# Patient Record
Sex: MALE | Race: WHITE | Employment: FULL TIME | URBAN - METROPOLITAN AREA
[De-identification: names, ages, dates, MRNs, and addresses within clinical notes are randomized per-mention and may not be internally consistent; named-entity substitution may affect disease eponyms.]

---

## 2017-01-03 ENCOUNTER — TELEPHONE (OUTPATIENT)
Dept: SLEEP MEDICINE | Age: 52
End: 2017-01-03

## 2017-01-20 ENCOUNTER — TELEPHONE (OUTPATIENT)
Dept: PULMONOLOGY | Facility: CLINIC | Age: 52
End: 2017-01-20

## 2017-01-20 NOTE — TELEPHONE ENCOUNTER
Patient returned call regarding scheduling a f/u. States he did not have sleep study due to cost and is no longer having symptoms. Will follow up with Dr. Scott Wallace from Cardiology next week and ask if a Pulmonary f/u is needed at that time.

## 2017-01-24 ENCOUNTER — OFFICE VISIT (OUTPATIENT)
Dept: CARDIOLOGY CLINIC | Age: 52
End: 2017-01-24

## 2017-01-24 VITALS
OXYGEN SATURATION: 98 % | DIASTOLIC BLOOD PRESSURE: 95 MMHG | SYSTOLIC BLOOD PRESSURE: 159 MMHG | HEIGHT: 75 IN | HEART RATE: 63 BPM | BODY MASS INDEX: 33.2 KG/M2 | WEIGHT: 267 LBS

## 2017-01-24 DIAGNOSIS — I48.20 CHRONIC ATRIAL FIBRILLATION (HCC): Primary | ICD-10-CM

## 2017-01-24 RX ORDER — AMIODARONE HYDROCHLORIDE 200 MG/1
200 TABLET ORAL DAILY
COMMUNITY
End: 2018-10-30 | Stop reason: SDUPTHER

## 2017-01-24 NOTE — PROGRESS NOTES
1. Have you been to the ER, urgent care clinic since your last visit? Hospitalized since your last visit? No    2. Have you seen or consulted any other health care providers outside of the [address] [place] [address] since your last visit? Include any pap smears or colon screening.  No

## 2017-01-24 NOTE — PROGRESS NOTES
[facility]    Mr. Nickie Alonso is a 45 y.o.male with history of paroxysmal atrial fibrillation s/p cardioversion 12/16, hypertension and obesity. Mr. Nickie Alonso is here today for follow up appointment. He is feeling okay. He denies any chest pain or shortness of breath. In fact he says after starting Amiodarone he has not felt any palpitations anymore. He is taking his medications regularly. He denies any side effects from the medication. He denies any bleeding problem. Denies any nausea, vomiting, abdominal pain, fever, chills, sputum production. No hematuria or other bleeding complaints    Past Medical History   Diagnosis Date    Atrial fibrillation ([facility] [address] 04/2016     S/P AMPARO CARDIOVERFSION (12/16)    HTN (hypertension)     Obesity          Past Surgical History   Procedure Laterality Date    Hx appendectomy         Current Outpatient Prescriptions   Medication Sig    amiodarone (CORDARONE) 200 mg tablet Take 200 mg by mouth daily.  simvastatin (ZOCOR) 10 mg tablet Take 10 mg by mouth nightly.  metoprolol succinate (TOPROL-XL) 50 mg XL tablet Take 50 mg by mouth daily.  rivaroxaban (XARELTO) 20 mg tab tablet Take 1 Tab by mouth daily (with dinner). No current facility-administered medications for this visit. Allergies and Sensitivities:  Allergies   Allergen Reactions    Aleve [Naproxen Sodium] Vertigo    Flagyl [Metronidazole] Vertigo       Family History:  Family History   Problem Relation Age of Onset   Willam Allen Cancer Mother     Heart Disease Mother     Dementia Father        Social History:  Social History   Substance Use Topics    Smoking status: Never Smoker    Smokeless tobacco: Never Used    Alcohol use No     He  reports that he has never smoked. He has never used smokeless tobacco.  He  reports that he does not drink alcohol. Review of Systems:  Cardiac symptoms as noted above in HPI.  All others negative. Denies joint pain, blurring vision, photophobia, neck pain, hemoptysis, chronic cough, nausea, vomiting, hematuria, burning micturition, BRBPR, chronic headaches. Physical Exam:  BP Readings from Last 3 Encounters:   01/24/17 (!) 159/95   12/20/16 130/80   12/20/16 (!) 137/98         Pulse Readings from Last 3 Encounters:   01/24/17 63   12/20/16 73   12/20/16 98          Wt Readings from Last 3 Encounters:   01/24/17 267 lb (121.1 kg)   12/20/16 259 lb (117.5 kg)   12/20/16 259 lb (117.5 kg)     Constitutional: Oriented to person, place, and time. HENT: Head: Normocephalic and atraumatic. Neck: No JVD present. Cardiovascular: Regular rhythm. No murmur, gallop or rubs appreciated  Lung: Breath sounds normal. No respiratory distress. No ronchi or rales appreciated  Abdominal: No tenderness. No rebound and no guarding. Musculoskeletal: There is no lower extremity edema. No cynosis    Review of Data  LABS:   Lab Results   Component Value Date/Time    Sodium 138 12/01/2016 09:38 AM    Potassium 4.6 12/01/2016 09:38 AM    Chloride 99 12/01/2016 09:38 AM    CO2 27 12/01/2016 09:38 AM    Glucose 119 12/01/2016 09:38 AM    BUN 14 12/01/2016 09:38 AM    Creatinine 0.99 12/01/2016 09:38 AM     No flowsheet data found. Lab Results   Component Value Date/Time    ALT 39 04/20/2016 01:15 PM     No results found for: HBA1C, HGBE8, GQN6CUHK, TPI2KOSW, IUR7BELU    EKG  (05/16) Sinus rhythm at 68 bpm. Normal NJ and QRS interval. No ST changes of ischemia. (12/16) A.fib at 88 bpm.   (01/17) Sinus bradycardia at 57 bp    ECHO (04/16)  Left ventricle: Systolic function was at the lower limits of normal.  Ejection fraction was estimated to be 50 %. No obvious wall motion  abnormalities identified in the views obtained. Wall thickness was mildly  increased. There was mild concentric hypertrophy. The study was not  technically sufficient to allow evaluation of LV diastolic function.   Right ventricle: Systolic function was normal.  Mitral valve: There was trivial regurgitation. Aortic valve: There was no stenosis. IMPRESSION & PLAN:  Mr. Staci Levin is a 45 y.o.d male with paroxysmal atrial fibrillation, hypertension, obesity. Atrial fibrillation:    Paroxysmal a.fib   AMPARO cardioversion (12/16) was successful and back in NSR  But on 12/20/16, back in a.fib. Started on amiodarone on 12/21/16 and now back in NSR again. Continue amiodarone to maintain rhythm. Long term side effects was discussed. Recent AST and TSH and PFT within normal limits. Continue xarelto. If remains in sinus rhythm, may consider multaq and may consider DC xarelto in future. For now continue all same    Hypertension:  He is on Toprol XL 50 mg daily. Repeat /84 mm hg.     Obesity:  He is performing exercise program to lose weight. Today his weight is 267 pounds. His weight is up. He states he has not been exercising and he is planning to work on that     Probable sleep apnea:  Sleep study ordered by pulmonary team. But not done yet. He will schedule. Importance of diet and exercise was discussed with patient. This plan was discussed with patient who is in agreement. Thank you for allowing me to participate in patient care. Please feel free to call me if you have any question or concern. Jc Whitehead MD  Please note: This document has been produced using voice recognition software. Unrecognized errors in transcription may be present.

## 2017-01-24 NOTE — LETTER
2/27/2017 Patient:  Yuri Keys YOB: 1965 Date of Visit: 1/24/2017 [place] 
[address] [facility] [address] [address] VIA Facsimile: [phone] 
 : 
 
 
                                                           [facility] Mr. Darien Ferreira is a 45 y.o.male with history of paroxysmal atrial fibrillation s/p cardioversion 12/16, hypertension and obesity. Mr. Darien Ferreira is here today for follow up appointment. He is feeling okay. He denies any chest pain or shortness of breath. In fact he says after starting Amiodarone he has not felt any palpitations anymore. He is taking his medications regularly. He denies any side effects from the medication. He denies any bleeding problem. Denies any nausea, vomiting, abdominal pain, fever, chills, sputum production. No hematuria or other bleeding complaints Past Medical History Diagnosis Date  Atrial fibrillation ([address] 04/2016 S/P AMPARO CARDIOVERFSION (12/16)  HTN (hypertension)  Obesity Past Surgical History Procedure Laterality Date  Hx appendectomy Current Outpatient Prescriptions Medication Sig  
 amiodarone (CORDARONE) 200 mg tablet Take 200 mg by mouth daily.  simvastatin (ZOCOR) 10 mg tablet Take 10 mg by mouth nightly.  metoprolol succinate (TOPROL-XL) 50 mg XL tablet Take 50 mg by mouth daily.  rivaroxaban (XARELTO) 20 mg tab tablet Take 1 Tab by mouth daily (with dinner). No current facility-administered medications for this visit. Allergies and Sensitivities: 
Allergies Allergen Reactions  Aleve [Naproxen Sodium] Vertigo  Flagyl [Metronidazole] Vertigo Family History: 
Family History Problem Relation Age of Onset  Cancer Mother  Heart Disease Mother  Dementia Father Social History: 
Social History Substance Use Topics  Smoking status: Never Smoker  Smokeless tobacco: Never Used  Alcohol use No  
 
He  reports that he has never smoked. He has never used smokeless tobacco.  He  reports that he does not drink alcohol. Review of Systems: 
Cardiac symptoms as noted above in HPI. All others negative. Denies joint pain, blurring vision, photophobia, neck pain, hemoptysis, chronic cough, nausea, vomiting, hematuria, burning micturition, BRBPR, chronic headaches. Physical Exam: 
BP Readings from Last 3 Encounters:  
01/24/17 (!) 159/95  
12/20/16 130/80  
12/20/16 (!) 137/98 Pulse Readings from Last 3 Encounters:  
01/24/17 63  
12/20/16 73  
12/20/16 98 Wt Readings from Last 3 Encounters:  
01/24/17 267 lb (121.1 kg) 12/20/16 259 lb (117.5 kg) 12/20/16 259 lb (117.5 kg) Constitutional: Oriented to person, place, and time. HENT: Head: Normocephalic and atraumatic. Neck: No JVD present. Cardiovascular: Regular rhythm. No murmur, gallop or rubs appreciated Lung: Breath sounds normal. No respiratory distress. No ronchi or rales appreciated Abdominal: No tenderness. No rebound and no guarding. Musculoskeletal: There is no lower extremity edema. No cynosis Review of Data LABS:  
Lab Results Component Value Date/Time Sodium 138 12/01/2016 09:38 AM  
 Potassium 4.6 12/01/2016 09:38 AM  
 Chloride 99 12/01/2016 09:38 AM  
 CO2 27 12/01/2016 09:38 AM  
 Glucose 119 12/01/2016 09:38 AM  
 BUN 14 12/01/2016 09:38 AM  
 Creatinine 0.99 12/01/2016 09:38 AM  
 
No flowsheet data found. Lab Results Component Value Date/Time ALT 39 04/20/2016 01:15 PM  
 
No results found for: HBA1C, HGBE8, MUS2DYML, GYC6ATXG, PYJ3RDBU 
 
EKG 
(05/16) Sinus rhythm at 68 bpm. Normal NJ and QRS interval. No ST changes of ischemia. (12/16) A.fib at 88 bpm.  
(01/17) Sinus bradycardia at 57 bp 
 
ECHO (04/16) Left ventricle: Systolic function was at the lower limits of normal. 
Ejection fraction was estimated to be 50 %. No obvious wall motion abnormalities identified in the views obtained. Wall thickness was mildly 
increased. There was mild concentric hypertrophy. The study was not 
technically sufficient to allow evaluation of LV diastolic function. Right ventricle: Systolic function was normal. 
Mitral valve: There was trivial regurgitation. Aortic valve: There was no stenosis. IMPRESSION & PLAN: 
Mr. Shannon Wilson is a 45 y.o.d male with paroxysmal atrial fibrillation, hypertension, obesity. Atrial fibrillation:   
Paroxysmal a.fib  
AMPARO cardioversion (12/16) was successful and back in NSR But on 12/20/16, back in a.fib. Started on amiodarone on 12/21/16 and now back in NSR again. Continue amiodarone to maintain rhythm. Long term side effects was discussed. Recent AST and TSH and PFT within normal limits. Continue xarelto. If remains in sinus rhythm, may consider multaq and may consider DC xarelto in future. For now continue all same Hypertension:  He is on Toprol XL 50 mg daily. Repeat /84 mm hg.  
 
Obesity:  He is performing exercise program to lose weight. Today his weight is 267 pounds. His weight is up. He states he has not been exercising and he is planning to work on that Probable sleep apnea:  Sleep study ordered by pulmonary team. But not done yet. He will schedule. Importance of diet and exercise was discussed with patient. This plan was discussed with patient who is in agreement. Thank you for allowing me to participate in patient care. Please feel free to call me if you have any question or concern. Noa Givens MD 
Please note: This document has been produced using voice recognition software. Unrecognized errors in transcription may be present. If you have questions, please do not hesitate to call me. I look forward to following Mr. Shannon Wilson along with you. Sincerely, Allen Fenton MD

## 2017-01-24 NOTE — MR AVS SNAPSHOT
Visit Information Date & Time Provider Department Dept. Phone Encounter #  
 1/24/2017  8:45 AM Armani Sloan MD [address] [place] [address] Specialist at [place] [id number] Follow-up Instructions Return in about 6 months (around 7/24/2017). Your Appointments 7/25/2017  8:45 AM  
Follow Up with Armani Sloan MD  
Cardio Specialist at [facility] [address] [facility]) Appt Note: 6 m f/u  
 [address]  
  
   
 [address] [facility] [address] Upcoming Health Maintenance Date Due Hepatitis C Screening 1965 DTaP/Tdap/Td series (1 - Tdap) 8/31/1985 FOBT Q 1 YEAR AGE 50-75 8/31/2014 INFLUENZA AGE 9 TO ADULT 8/1/2016 Allergies as of 1/24/2017  Review Complete On: 1/24/2017 By: Kamron Day LPN Severity Noted Reaction Type Reactions Aleve [Naproxen Sodium]  12/23/2014    Vertigo Flagyl [Metronidazole]  12/23/2014    Vertigo Current Immunizations  Reviewed on 12/20/2016 No immunizations on file. Not reviewed this visit You Were Diagnosed With   
  
 Codes Comments Chronic atrial fibrillation (HCC)    -  Primary ICD-10-CM: Y04.1 ICD-9-CM: 427.31 Vitals BP Pulse Height(growth percentile) Weight(growth percentile) SpO2 BMI  
 (!) 159/95 63 6' 3\" (1.905 m) 267 lb (121.1 kg) 98% 33.37 kg/m2 Smoking Status Never Smoker Vitals History BMI and BSA Data Body Mass Index Body Surface Area  
 33.37 kg/m 2 2.53 m 2 Preferred Pharmacy Pharmacy Name Phone [address] [place] [phone] Your Updated Medication List  
  
   
This list is accurate as of: 1/24/17  9:05 AM.  Always use your most recent med list.  
  
  
  
  
 amiodarone 200 mg tablet Commonly known as:  CORDARONE Take 200 mg by mouth daily. metoprolol succinate 50 mg XL tablet Commonly known as:  TOPROL-XL Take 50 mg by mouth daily. rivaroxaban 20 mg Tab tablet Commonly known as:  Rock Snowball Take 1 Tab by mouth daily (with dinner). simvastatin 10 mg tablet Commonly known as:  ZOCOR Take 10 mg by mouth nightly. We Performed the Following AMB POC EKG ROUTINE W/ 12 LEADS, INTER & REP [31544 CPT(R)] Follow-up Instructions Return in about 6 months (around 7/24/2017). Introducing [facility]! [employer] introduces [employer] patient portal. Now you can access parts of your medical record, email your doctor's office, and request medication refills online. 1. In your internet browser, go to [email or web address] 2. Click on the First Time User? Click Here link in the Sign In box. You will see the New Member Sign Up page. 3. Enter your [employer] Access Code exactly as it appears below. You will not need to use this code after youve completed the sign-up process. If you do not sign up before the expiration date, you must request a new code. · [employer] Access Code: [email or web address] Expires: 2/27/2017  9:55 AM 
 
4. Enter the last four digits of your Social Security Number (xxxx) and Date of Birth (mm/dd/yyyy) as indicated and click Submit. You will be taken to the next sign-up page. 5. Create a [employer] ID. This will be your [employer] login ID and cannot be changed, so think of one that is secure and easy to remember. 6. Create a [employer] password. You can change your password at any time. 7. Enter your Password Reset Question and Answer. This can be used at a later time if you forget your password. 8. Enter your e-mail address. You will receive e-mail notification when new information is available in [address] 9. Click Sign Up. You can now view and download portions of your medical record.  
10. Click the Download Summary menu link to download a portable copy of your medical information. If you have questions, please visit the Frequently Asked Questions section of the [employer] website. Remember, [employer] is NOT to be used for urgent needs. For medical emergencies, dial 911. Now available from your iPhone and Android! Please provide this summary of care documentation to your next provider. Your primary care clinician is listed as Emma Ortega. If you have any questions after today's visit, please call [phone].

## 2017-02-27 NOTE — COMMUNICATION BODY
Cardiovascular Specialists    Mr. Debra Barnes is a 45 y.o.male with history of paroxysmal atrial fibrillation s/p cardioversion 12/16, hypertension and obesity. Mr. Debra Barnes is here today for follow up appointment. He is feeling okay. He denies any chest pain or shortness of breath. In fact he says after starting Amiodarone he has not felt any palpitations anymore. He is taking his medications regularly. He denies any side effects from the medication. He denies any bleeding problem. Denies any nausea, vomiting, abdominal pain, fever, chills, sputum production. No hematuria or other bleeding complaints    Past Medical History   Diagnosis Date    Atrial fibrillation ([address] 04/2016     S/P AMPARO CARDIOVERFSION (12/16)    HTN (hypertension)     Obesity          Past Surgical History   Procedure Laterality Date    Hx appendectomy         Current Outpatient Prescriptions   Medication Sig    amiodarone (CORDARONE) 200 mg tablet Take 200 mg by mouth daily.  simvastatin (ZOCOR) 10 mg tablet Take 10 mg by mouth nightly.  metoprolol succinate (TOPROL-XL) 50 mg XL tablet Take 50 mg by mouth daily.  rivaroxaban (XARELTO) 20 mg tab tablet Take 1 Tab by mouth daily (with dinner). No current facility-administered medications for this visit. Allergies and Sensitivities:  Allergies   Allergen Reactions    Aleve [Naproxen Sodium] Vertigo    Flagyl [Metronidazole] Vertigo       Family History:  Family History   Problem Relation Age of Onset   Velta Ganser Cancer Mother     Heart Disease Mother     Dementia Father        Social History:  Social History   Substance Use Topics    Smoking status: Never Smoker    Smokeless tobacco: Never Used    Alcohol use No     He  reports that he has never smoked. He has never used smokeless tobacco.  He  reports that he does not drink alcohol. Review of Systems:  Cardiac symptoms as noted above in HPI.  All others negative. Denies joint pain, blurring vision, photophobia, neck pain, hemoptysis, chronic cough, nausea, vomiting, hematuria, burning micturition, BRBPR, chronic headaches. Physical Exam:  BP Readings from Last 3 Encounters:   01/24/17 (!) 159/95   12/20/16 130/80   12/20/16 (!) 137/98         Pulse Readings from Last 3 Encounters:   01/24/17 63   12/20/16 73   12/20/16 98          Wt Readings from Last 3 Encounters:   01/24/17 267 lb (121.1 kg)   12/20/16 259 lb (117.5 kg)   12/20/16 259 lb (117.5 kg)     Constitutional: Oriented to person, place, and time. HENT: Head: Normocephalic and atraumatic. Neck: No JVD present. Cardiovascular: Regular rhythm. No murmur, gallop or rubs appreciated  Lung: Breath sounds normal. No respiratory distress. No ronchi or rales appreciated  Abdominal: No tenderness. No rebound and no guarding. Musculoskeletal: There is no lower extremity edema. No cynosis    Review of Data  LABS:   Lab Results   Component Value Date/Time    Sodium 138 12/01/2016 09:38 AM    Potassium 4.6 12/01/2016 09:38 AM    Chloride 99 12/01/2016 09:38 AM    CO2 27 12/01/2016 09:38 AM    Glucose 119 12/01/2016 09:38 AM    BUN 14 12/01/2016 09:38 AM    Creatinine 0.99 12/01/2016 09:38 AM     No flowsheet data found. Lab Results   Component Value Date/Time    ALT 39 04/20/2016 01:15 PM     No results found for: HBA1C, HGBE8, LOZ0UGNR, QMB4XYKP, VGM5CIOF    EKG  (05/16) Sinus rhythm at 68 bpm. Normal RI and QRS interval. No ST changes of ischemia. (12/16) A.fib at 88 bpm.   (01/17) Sinus bradycardia at 57 bp    ECHO (04/16)  Left ventricle: Systolic function was at the lower limits of normal.  Ejection fraction was estimated to be 50 %. No obvious wall motion  abnormalities identified in the views obtained. Wall thickness was mildly  increased. There was mild concentric hypertrophy. The study was not  technically sufficient to allow evaluation of LV diastolic function.   Right ventricle: Systolic function was normal.  Mitral valve: There was trivial regurgitation. Aortic valve: There was no stenosis. IMPRESSION & PLAN:  Mr. Shante Flor is a 45 y.o.d male with paroxysmal atrial fibrillation, hypertension, obesity. Atrial fibrillation:    Paroxysmal a.fib   AMPARO cardioversion (12/16) was successful and back in NSR  But on 12/20/16, back in a.fib. Started on amiodarone on 12/21/16 and now back in NSR again. Continue amiodarone to maintain rhythm. Long term side effects was discussed. Recent AST and TSH and PFT within normal limits. Continue xarelto. If remains in sinus rhythm, may consider multaq and may consider DC xarelto in future. For now continue all same    Hypertension:  He is on Toprol XL 50 mg daily. Repeat /84 mm hg.     Obesity:  He is performing exercise program to lose weight. Today his weight is 267 pounds. His weight is up. He states he has not been exercising and he is planning to work on that     Probable sleep apnea:  Sleep study ordered by pulmonary team. But not done yet. He will schedule. Importance of diet and exercise was discussed with patient. This plan was discussed with patient who is in agreement. Thank you for allowing me to participate in patient care. Please feel free to call me if you have any question or concern. Laila Moraes MD  Please note: This document has been produced using voice recognition software. Unrecognized errors in transcription may be present.

## 2017-06-29 RX ORDER — RIVAROXABAN 20 MG/1
TABLET, FILM COATED ORAL
Qty: 30 TAB | Refills: 6 | Status: SHIPPED | OUTPATIENT
Start: 2017-06-29 | End: 2018-03-19 | Stop reason: SDUPTHER

## 2017-08-15 ENCOUNTER — OFFICE VISIT (OUTPATIENT)
Dept: CARDIOLOGY CLINIC | Age: 52
End: 2017-08-15

## 2017-08-15 VITALS
HEIGHT: 75 IN | WEIGHT: 275 LBS | OXYGEN SATURATION: 97 % | DIASTOLIC BLOOD PRESSURE: 96 MMHG | BODY MASS INDEX: 34.19 KG/M2 | HEART RATE: 62 BPM | SYSTOLIC BLOOD PRESSURE: 157 MMHG

## 2017-08-15 DIAGNOSIS — I48.0 PAF (PAROXYSMAL ATRIAL FIBRILLATION) (HCC): Primary | ICD-10-CM

## 2017-08-15 DIAGNOSIS — I10 ESSENTIAL HYPERTENSION WITH GOAL BLOOD PRESSURE LESS THAN 140/90: ICD-10-CM

## 2017-08-15 DIAGNOSIS — E66.01 MORBID OBESITY, UNSPECIFIED OBESITY TYPE (HCC): ICD-10-CM

## 2017-08-15 NOTE — PROGRESS NOTES
1. Have you been to the ER, urgent care clinic since your last visit? Hospitalized since your last visit? No    2. Have you seen or consulted any other health care providers outside of the [address] [place] [address] since your last visit? Include any pap smears or colon screening.  No

## 2017-08-15 NOTE — PROGRESS NOTES
[facility]ists    Mr. Piage Walker is a 45 y.o.male with history of paroxysmal atrial fibrillation s/p cardioversion 12/16, hypertension and obesity. Mr. Paige Walker is here today for follow up appointment. He denies any symptoms of palpitations or atrial fibrillation. He denies any presyncope or syncope. He is taking his medications regularly. Overall he has no symptoms and he is tolerating his medications well. Denies any nausea, vomiting, abdominal pain, fever, chills, sputum production. No hematuria or other bleeding complaints    Past Medical History:   Diagnosis Date    Atrial fibrillation ([address] 04/2016    S/P AMPARO CARDIOVERFSION (12/16)    HTN (hypertension)     Obesity          Past Surgical History:   Procedure Laterality Date    HX APPENDECTOMY         Current Outpatient Prescriptions   Medication Sig    XARELTO 20 mg tab tablet take 1 tablet by mouth ONCE DAILY WITH DINNER    amiodarone (CORDARONE) 200 mg tablet Take 200 mg by mouth daily.  metoprolol succinate (TOPROL-XL) 50 mg XL tablet Take 50 mg by mouth daily. No current facility-administered medications for this visit. Allergies and Sensitivities:  Allergies   Allergen Reactions    Aleve [Naproxen Sodium] Vertigo    Flagyl [Metronidazole] Vertigo       Family History:  Family History   Problem Relation Age of Onset   Patel Bogus Cancer Mother     Heart Disease Mother     Dementia Father        Social History:  Social History   Substance Use Topics    Smoking status: Never Smoker    Smokeless tobacco: Never Used    Alcohol use No     He  reports that he has never smoked. He has never used smokeless tobacco.  He  reports that he does not drink alcohol. Review of Systems:  Cardiac symptoms as noted above in HPI. All others negative.   Denies joint pain, blurring vision, photophobia, neck pain, hemoptysis, chronic cough, nausea, vomiting, hematuria, burning micturition, BRBPR, chronic headaches. Physical Exam:  BP Readings from Last 3 Encounters:   08/15/17 (!) 157/96   01/24/17 (!) 159/95   12/20/16 130/80         Pulse Readings from Last 3 Encounters:   08/15/17 62   01/24/17 63   12/20/16 73          Wt Readings from Last 3 Encounters:   08/15/17 275 lb (124.7 kg)   01/24/17 267 lb (121.1 kg)   12/20/16 259 lb (117.5 kg)     Constitutional: Oriented to person, place, and time. HENT: Head: Normocephalic and atraumatic. Neck: No JVD present. Cardiovascular: Regular rhythm. No murmur, gallop or rubs appreciated  Lung: Breath sounds normal. No respiratory distress. No ronchi or rales appreciated  Abdominal: No tenderness. No rebound and no guarding. Musculoskeletal: There is no lower extremity edema. No cynosis    Review of Data  LABS:   Lab Results   Component Value Date/Time    Sodium 138 12/01/2016 09:38 AM    Potassium 4.6 12/01/2016 09:38 AM    Chloride 99 12/01/2016 09:38 AM    CO2 27 12/01/2016 09:38 AM    Glucose 119 12/01/2016 09:38 AM    BUN 14 12/01/2016 09:38 AM    Creatinine 0.99 12/01/2016 09:38 AM     No flowsheet data found. Lab Results   Component Value Date/Time    ALT (SGPT) 39 04/20/2016 01:15 PM     No results found for: HBA1C, HGBE8, ZPI6BGSR, TNR0FVTC, JVR5MCVO    (06/17)   , TG 66, , HDL 43. TSH 1.9  AST 16, ALT 21      EKG  (05/16) Sinus rhythm at 68 bpm. Normal MO and QRS interval. No ST changes of ischemia. (12/16) A.fib at 88 bpm.   (01/17) Sinus bradycardia at 57 bp    ECHO (04/16)  Left ventricle: Systolic function was at the lower limits of normal.  Ejection fraction was estimated to be 50 %. No obvious wall motion  abnormalities identified in the views obtained. Wall thickness was mildly  increased. There was mild concentric hypertrophy. The study was not  technically sufficient to allow evaluation of LV diastolic function. Right ventricle: Systolic function was normal.  Mitral valve:  There was trivial regurgitation. Aortic valve: There was no stenosis. IMPRESSION & PLAN:  Mr. Farrukh Randhawa is a 45 y.o.d male with paroxysmal atrial fibrillation, hypertension, obesity. Atrial fibrillation:    Paroxysmal a.fib   AMPARO cardioversion (12/16) was successful and back in NSR  But on 12/20/16, back in a.fib. Started on amiodarone on 12/21/16 and now back in NSR again on exam since then  Continue amiodarone to maintain rhythm. Long term side effects was discussed. Recent AST and TSH and PFT within normal limits in 06/17  Continue xarelto. If remains in sinus rhythm, may consider multaq if any concern with side effects from amiodarone and may consider DC xarelto in future. Hypertension:  He is on Toprol XL 50 mg daily. BP repeat 150/90 mm Hg. Obesity: Today his weight is 267--->275 pounds. His weight is up. DW patient and advice to lose weight      Importance of diet and exercise was discussed with patient. This plan was discussed with patient who is in agreement. Thank you for allowing me to participate in patient care. Please feel free to call me if you have any question or concern. Kiara Rashid MD  Please note: This document has been produced using voice recognition software. Unrecognized errors in transcription may be present.

## 2017-08-15 NOTE — MR AVS SNAPSHOT
Visit Information Date & Time Provider Department Dept. Phone Encounter #  
 8/15/2017  3:15 PM Armani Freeman  [facility] [phone] [id number] Follow-up Instructions Return in about 1 year (around 8/15/2018). Upcoming Health Maintenance Date Due Hepatitis C Screening 1965 DTaP/Tdap/Td series (1 - Tdap) 8/31/1985 FOBT Q 1 YEAR AGE 50-75 8/31/2014 INFLUENZA AGE 9 TO ADULT 8/1/2017 Allergies as of 8/15/2017  Review Complete On: 8/15/2017 By: Keith Monte LPN Severity Noted Reaction Type Reactions Aleve [Naproxen Sodium]  12/23/2014    Vertigo Flagyl [Metronidazole]  12/23/2014    Vertigo Current Immunizations  Reviewed on 12/20/2016 No immunizations on file. Not reviewed this visit Vitals BP Pulse Height(growth percentile) Weight(growth percentile) SpO2 BMI  
 (!) 157/96 62 6' 3\" (1.905 m) 275 lb (124.7 kg) 97% 34.37 kg/m2 Smoking Status Never Smoker BMI and BSA Data Body Mass Index Body Surface Area  
 34.37 kg/m 2 2.57 m 2 Preferred Pharmacy Pharmacy Name Phone [address] [facility] [phone] Your Updated Medication List  
  
   
This list is accurate as of: 8/15/17  3:15 PM.  Always use your most recent med list.  
  
  
  
  
 amiodarone 200 mg tablet Commonly known as:  CORDARONE Take 200 mg by mouth daily. metoprolol succinate 50 mg XL tablet Commonly known as:  TOPROL-XL Take 50 mg by mouth daily. XARELTO 20 mg Tab tablet Generic drug:  rivaroxaban  
take 1 tablet by mouth ONCE DAILY WITH DINNER Follow-up Instructions Return in about 1 year (around 8/15/2018). Patient Instructions Decrease Amiodarone to every other day Introducing [facility]!    
 Delfino Martins introduces [employer] patient portal. Now you can access parts of your medical record, email your doctor's office, and request medication refills online. 1. In your internet browser, go to [email or web address] 2. Click on the First Time User? Click Here link in the Sign In box. You will see the New Member Sign Up page. 3. Enter your Youxinp[employer] Access Code exactly as it appears below. You will not need to use this code after youve completed the sign-up process. If you do not sign up before the expiration date, you must request a new code. · Youxin[employer] Access Code: [email or web address] Expires: 11/13/2017  3:15 PM 
 
4. Enter the last four digits of your Social Security Number (xxxx) and Date of Birth (mm/dd/yyyy) as indicated and click Submit. You will be taken to the next sign-up page. 5. Create a Youxinpai ID. This will be your Youxinpai login ID and cannot be changed, so think of one that is secure and easy to remember. 6. Create a Youxinpai password. You can change your password at any time. 7. Enter your Password Reset Question and Answer. This can be used at a later time if you forget your password. 8. Enter your e-mail address. You will receive e-mail notification when new information is available in [address] 9. Click Sign Up. You can now view and download portions of your medical record. 10. Click the Download Summary menu link to download a portable copy of your medical information. If you have questions, please visit the Frequently Asked Questions section of the Youxinpai website. Remember, Youxinpai is NOT to be used for urgent needs. For medical emergencies, dial 911. Now available from your iPhone and Android! Please provide this summary of care documentation to your next provider. Your primary care clinician is listed as Ryne Santa. If you have any questions after today's visit, please call [phone].

## 2017-11-06 RX ORDER — AMIODARONE HYDROCHLORIDE 200 MG/1
TABLET ORAL
Qty: 50 TAB | Refills: 6 | Status: SHIPPED | OUTPATIENT
Start: 2017-11-06 | End: 2018-10-30 | Stop reason: SDUPTHER

## 2018-03-19 RX ORDER — RIVAROXABAN 20 MG/1
TABLET, FILM COATED ORAL
Qty: 30 TAB | Refills: 6 | Status: SHIPPED | OUTPATIENT
Start: 2018-03-19 | End: 2018-11-10 | Stop reason: SDUPTHER

## 2018-10-30 RX ORDER — AMIODARONE HYDROCHLORIDE 200 MG/1
200 TABLET ORAL DAILY
Qty: 90 TAB | Refills: 3 | Status: SHIPPED | OUTPATIENT
Start: 2018-10-30 | End: 2018-11-26

## 2018-10-30 NOTE — TELEPHONE ENCOUNTER
Patient is calling to request refill on Amiodarone 200 mg, he would like it sent to [facility]. Patient has follow up appointment scheduled for 11/26/2018.

## 2018-11-14 RX ORDER — RIVAROXABAN 20 MG/1
TABLET, FILM COATED ORAL
Qty: 30 TAB | Refills: 6 | Status: SHIPPED | OUTPATIENT
Start: 2018-11-14 | End: 2019-09-02 | Stop reason: SDUPTHER

## 2018-11-26 ENCOUNTER — OFFICE VISIT (OUTPATIENT)
Dept: CARDIOLOGY CLINIC | Age: 53
End: 2018-11-26

## 2018-11-26 VITALS
OXYGEN SATURATION: 98 % | SYSTOLIC BLOOD PRESSURE: 155 MMHG | HEART RATE: 61 BPM | WEIGHT: 265 LBS | DIASTOLIC BLOOD PRESSURE: 97 MMHG | BODY MASS INDEX: 33.12 KG/M2

## 2018-11-26 DIAGNOSIS — I10 ESSENTIAL HYPERTENSION WITH GOAL BLOOD PRESSURE LESS THAN 140/90: ICD-10-CM

## 2018-11-26 DIAGNOSIS — E66.01 MORBID OBESITY, UNSPECIFIED OBESITY TYPE (HCC): ICD-10-CM

## 2018-11-26 DIAGNOSIS — I48.0 PAF (PAROXYSMAL ATRIAL FIBRILLATION) (HCC): Primary | ICD-10-CM

## 2018-11-26 RX ORDER — AMIODARONE HYDROCHLORIDE 200 MG/1
200 TABLET ORAL EVERY OTHER DAY
Qty: 90 TAB | Refills: 3 | Status: SHIPPED | OUTPATIENT
Start: 2018-11-26 | End: 2020-01-01

## 2018-11-26 NOTE — PROGRESS NOTES
1. Have you been to the ER, urgent care clinic since your last visit? Hospitalized since your last visit? No  
 
2. Have you seen or consulted any other health care providers outside of the [address] [place] [address] since your last visit? Include any pap smears or colon screening.  No

## 2018-11-26 NOTE — PROGRESS NOTES
Cardiovascular Specialists Mr. Alyssia Ramos is a 46 y.o.male with history of paroxysmal atrial fibrillation s/p cardioversion 12/16, hypertension and obesity. Mr. Alyssia Ramos is here today for follow up appointment. Denies any resting or exertional chest pain or chest pressure to suggest angina or any dyspnea to suggest heart failure. No presyncope or syncope Denies any PND or LE edema. Taking all medications regularly. Rides bike and roller blade for 7-10 miles without any symptoms. Was able to lose weight with diet and exercise down to 241 lbs but now gained back Denies any nausea, vomiting, abdominal pain, fever, chills, sputum production. No hematuria or other bleeding complaints Past Medical History:  
Diagnosis Date  Atrial fibrillation ([address] 04/2016 S/P AMPARO CARDIOVERFSION (12/16)  HTN (hypertension)  Obesity Past Surgical History:  
Procedure Laterality Date  HX APPENDECTOMY Current Outpatient Medications Medication Sig  XARELTO 20 mg tab tablet take 1 tablet by mouth once daily WITH DINNER  
 amiodarone (CORDARONE) 200 mg tablet Take 1 Tab by mouth daily.  metoprolol succinate (TOPROL-XL) 50 mg XL tablet Take 50 mg by mouth daily. No current facility-administered medications for this visit. Allergies and Sensitivities: 
Allergies Allergen Reactions  Aleve [Naproxen Sodium] Vertigo  Flagyl [Metronidazole] Vertigo Family History: 
Family History Problem Relation Age of Onset  Cancer Mother  Heart Disease Mother  Dementia Father Social History: 
Social History Tobacco Use  Smoking status: Never Smoker  Smokeless tobacco: Never Used Substance Use Topics  Alcohol use: No  
 Drug use: No  
 
He  reports that  has never smoked. he has never used smokeless tobacco.  He  reports that he does not drink alcohol. Review of Systems: Cardiac symptoms as noted above in HPI. All others negative. Denies joint pain, blurring vision, photophobia, neck pain, hemoptysis, chronic cough, nausea, vomiting, hematuria, burning micturition, BRBPR, chronic headaches. Physical Exam: 
BP Readings from Last 3 Encounters:  
11/26/18 (!) 155/97  
08/15/17 (!) 157/96  
01/24/17 (!) 159/95 Pulse Readings from Last 3 Encounters:  
11/26/18 61  
08/15/17 62  
01/24/17 63 Wt Readings from Last 3 Encounters:  
11/26/18 265 lb (120.2 kg) 08/15/17 275 lb (124.7 kg) 01/24/17 267 lb (121.1 kg) Constitutional: Oriented to person, place, and time. HENT: Head: Normocephalic and atraumatic. Neck: No JVD present. Cardiovascular: Regular rhythm. No murmur, gallop or rubs appreciated Lung: Breath sounds normal. No respiratory distress. No ronchi or rales appreciated Abdominal: No tenderness. No rebound and no guarding. Musculoskeletal: There is no lower extremity edema. No cynosis Review of Data LABS:  
Lab Results Component Value Date/Time Sodium 138 12/01/2016 09:38 AM  
 Potassium 4.6 12/01/2016 09:38 AM  
 Chloride 99 12/01/2016 09:38 AM  
 CO2 27 12/01/2016 09:38 AM  
 Glucose 119 (H) 12/01/2016 09:38 AM  
 BUN 14 12/01/2016 09:38 AM  
 Creatinine 0.99 12/01/2016 09:38 AM  
 
No flowsheet data found. Lab Results Component Value Date/Time ALT (SGPT) 39 04/20/2016 01:15 PM  
 
No results found for: HBA1C, HGBE8, WLU6ITCO, IIG3XOMW, CKG2TKSO 
 
(06/17) , TG 66, , HDL 43. TSH 1.9 AST 16, ALT 21 EKG 
(05/16) Sinus rhythm at 68 bpm. Normal MD and QRS interval. No ST changes of ischemia. (12/16) A.fib at 88 bpm.  
(01/17) Sinus bradycardia at 57 bp 
 
ECHO (04/16) Left ventricle: Systolic function was at the lower limits of normal. 
Ejection fraction was estimated to be 50 %. No obvious wall motion 
abnormalities identified in the views obtained. Wall thickness was mildly increased. There was mild concentric hypertrophy. The study was not 
technically sufficient to allow evaluation of LV diastolic function. Right ventricle: Systolic function was normal. 
Mitral valve: There was trivial regurgitation. Aortic valve: There was no stenosis. IMPRESSION & PLAN: 
Mr. Becca Aquino is a 46 y.o.d male with paroxysmal atrial fibrillation, hypertension, obesity. Atrial fibrillation:   
Paroxysmal a.fib  
AMPARO cardioversion (12/16) was successful and back in NSR But on 12/20/16, back in a.fib. Started on amiodarone on 12/21/16 and now back in NSR again on exam since then Continue amiodarone to maintain rhythm. On every other day. Continue xarelto. If remains in sinus rhythm, may consider multaq if any concern with side effects from amiodarone and may consider DC xarelto in future. Hypertension:  He is on Toprol XL 50 mg daily. BP repeat 155/96 mm Hg. Prepeat BP was 148/92 mm Hg. Important of weight loss and diet was discussed. Obesity: Today his weight is 275-->241--> 265 pounds. His weight is up. DW patient and advice to lose weight as he was able to do recently and now has gained back up. Importance of diet and exercise was discussed with patient. This plan was discussed with patient who is in agreement. Thank you for allowing me to participate in patient care. Please feel free to call me if you have any question or concern. Chari Pearce MD 
Please note: This document has been produced using voice recognition software. Unrecognized errors in transcription may be present.

## 2018-11-28 ENCOUNTER — TELEPHONE (OUTPATIENT)
Dept: CARDIOLOGY CLINIC | Age: 53
End: 2018-11-28

## 2018-11-28 NOTE — TELEPHONE ENCOUNTER
Contacted pt a [facility] pharmacist. Two patient Identifiers confirmed. Advised per notes pt is to have 90/90 but may take medication prn as needed daily. Pharmacist verbalized understanding.

## 2019-09-03 RX ORDER — RIVAROXABAN 20 MG/1
TABLET, FILM COATED ORAL
Qty: 30 TAB | Refills: 6 | Status: SHIPPED | OUTPATIENT
Start: 2019-09-03 | End: 2020-03-16

## 2020-01-01 RX ORDER — AMIODARONE HYDROCHLORIDE 200 MG/1
TABLET ORAL
Qty: 90 TAB | Refills: 4 | Status: SHIPPED | OUTPATIENT
Start: 2020-01-01 | End: 2021-03-29 | Stop reason: SDUPTHER

## 2020-03-16 RX ORDER — RIVAROXABAN 20 MG/1
TABLET, FILM COATED ORAL
Qty: 30 TAB | Refills: 6 | Status: SHIPPED | OUTPATIENT
Start: 2020-03-16 | End: 2020-04-08

## 2020-04-08 RX ORDER — RIVAROXABAN 20 MG/1
TABLET, FILM COATED ORAL
Qty: 30 TAB | Refills: 6 | Status: SHIPPED | OUTPATIENT
Start: 2020-04-08 | End: 2021-04-20

## 2020-09-09 RX ORDER — AMIODARONE HYDROCHLORIDE 200 MG/1
TABLET ORAL
Qty: 90 TAB | Refills: 4 | Status: CANCELLED | OUTPATIENT
Start: 2020-09-09

## 2020-09-09 RX ORDER — METOPROLOL SUCCINATE 50 MG/1
50 TABLET, EXTENDED RELEASE ORAL DAILY
Status: CANCELLED | OUTPATIENT
Start: 2020-09-09

## 2020-09-09 NOTE — TELEPHONE ENCOUNTER
Requested Prescriptions     Pending Prescriptions Disp Refills    amiodarone (CORDARONE) 200 mg tablet 90 Tab 4    metoprolol succinate (TOPROL-XL) 50 mg XL tablet        Sig: Take 1 Tab by mouth daily.

## 2020-09-10 NOTE — TELEPHONE ENCOUNTER
PCP: Komal Elizabeth MD    Last appt: 11/26/2018  No future appointments. Requested Prescriptions     Pending Prescriptions Disp Refills    amiodarone (CORDARONE) 200 mg tablet 90 Tab 0     Sig: TAKE 1 TABLET DAILY. Appt required before further refills.  metoprolol succinate (TOPROL-XL) 50 mg XL tablet 90 Tab 0     Sig: Take 1 Tab by mouth daily. Appt required before further refills. Other Comments:  Last seen 2018. Needs appt before refills can be authorized.

## 2020-11-06 RX ORDER — METOPROLOL SUCCINATE 50 MG/1
50 TABLET, EXTENDED RELEASE ORAL DAILY
Status: CANCELLED | OUTPATIENT
Start: 2020-11-06

## 2020-11-06 RX ORDER — AMIODARONE HYDROCHLORIDE 200 MG/1
TABLET ORAL
Qty: 90 TAB | Refills: 4 | Status: CANCELLED | OUTPATIENT
Start: 2020-11-06

## 2020-11-06 NOTE — TELEPHONE ENCOUNTER
Requested Prescriptions     Pending Prescriptions Disp Refills    metoprolol succinate (TOPROL-XL) 50 mg XL tablet        Sig: Take 1 Tab by mouth daily.  amiodarone (CORDARONE) 200 mg tablet 90 Tab 4       Patient out of medication.

## 2020-11-09 NOTE — TELEPHONE ENCOUNTER
Pt last seen 2018. Appt required. Attempted to contact pt at  number, no answer. Lvm on secure line over due for appt. Medication refill deferred top PCP until appt completed. Advised pt to return call to office at [phone] to schedule f/u appt.

## 2020-11-23 ENCOUNTER — OFFICE VISIT (OUTPATIENT)
Dept: CARDIOLOGY CLINIC | Age: 55
End: 2020-11-23
Payer: COMMERCIAL

## 2020-11-23 ENCOUNTER — HOSPITAL ENCOUNTER (OUTPATIENT)
Dept: LAB | Age: 55
Discharge: HOME | End: 2020-11-23

## 2020-11-23 VITALS
RESPIRATION RATE: 16 BRPM | TEMPERATURE: 97.8 F | WEIGHT: 270.6 LBS | DIASTOLIC BLOOD PRESSURE: 88 MMHG | SYSTOLIC BLOOD PRESSURE: 142 MMHG | HEIGHT: 75 IN | OXYGEN SATURATION: 98 % | HEART RATE: 63 BPM | BODY MASS INDEX: 33.64 KG/M2

## 2020-11-23 DIAGNOSIS — I10 ESSENTIAL HYPERTENSION WITH GOAL BLOOD PRESSURE LESS THAN 140/90: Primary | ICD-10-CM

## 2020-11-23 DIAGNOSIS — I48.0 PAF (PAROXYSMAL ATRIAL FIBRILLATION) (HCC): ICD-10-CM

## 2020-11-23 LAB — XX-LABCORP SPECIMEN COL,LCBCF: NORMAL

## 2020-11-23 PROCEDURE — 93000 ELECTROCARDIOGRAM COMPLETE: CPT | Performed by: INTERNAL MEDICINE

## 2020-11-23 PROCEDURE — 99001 SPECIMEN HANDLING PT-LAB: CPT

## 2020-11-23 PROCEDURE — 99214 OFFICE O/P EST MOD 30 MIN: CPT | Performed by: INTERNAL MEDICINE

## 2020-11-23 RX ORDER — METOPROLOL SUCCINATE 50 MG/1
50 TABLET, EXTENDED RELEASE ORAL DAILY
Qty: 30 TAB | Refills: 1 | Status: SHIPPED | OUTPATIENT
Start: 2020-11-23 | End: 2020-12-09 | Stop reason: SDUPTHER

## 2020-11-23 NOTE — PATIENT INSTRUCTIONS
New Medication/Medication Changes Start taking Amiodarone every three days **please allow 24-48 hrs for medication to be escribed to pharmacy** Labs CMP and TSH No appointment required for lab work Hours are Mon-Fri 7:00 am-5:00 pm 
All labs are completed at: 
[address] [place] [address]

## 2020-11-23 NOTE — LETTER
11/23/20 Patient: Darleen Sterling YOB: 1965 Date of Visit: 11/23/2020 Rachelle Chan MD 
[address] [facility] [address] [id number] VIA Facsimile: [phone] Dear Rachelle Chan MD, Thank you for referring Mr. Theodora Willard to CARDIO SPECIALIST AT [address] [place] [address] for evaluation. My notes for this consultation are attached. If you have questions, please do not hesitate to call me. I look forward to following your patient along with you. Sincerely, Chevy Caruso MD

## 2020-11-23 NOTE — PROGRESS NOTES
Laurel Larson presents today for No chief complaint on file. Laurel Larson preferred language for health care discussion is english/other. Personal Protective Equipment:   Personal Protective Equipment was used including: mask-surgical and hands-gloves. Patient was placed on no precaution(s). Patient was masked. Is someone accompanying this pt? No    Is the patient using any DME equipment during OV? No    Depression Screening:  3 most recent PHQ Screens 11/26/2018   Little interest or pleasure in doing things Not at all   Feeling down, depressed, irritable, or hopeless Not at all   Total Score PHQ 2 0       Learning Assessment:  Learning Assessment 5/12/2016   PRIMARY LEARNER Patient   PRIMARY LANGUAGE ENGLISH   LEARNER PREFERENCE PRIMARY DEMONSTRATION   ANSWERED BY Patient   RELATIONSHIP SELF       Abuse Screening:  No flowsheet data found. Fall Risk  No flowsheet data found. Pt currently taking Anticoagulant therapy? No    Coordination of Care:  1. Have you been to the ER, urgent care clinic since your last visit? Hospitalized since your last visit? No    2. Have you seen or consulted any other health care providers outside of the [address] [place] [address] since your last visit? Include any pap smears or colon screening.  No

## 2020-11-23 NOTE — PROGRESS NOTES
Cardiovascular Specialists    Mr. Matias King is a 63 y.o.male with history of paroxysmal atrial fibrillation s/p cardioversion 12/16, hypertension and obesity. Mr. Matias King is here today for follow up appointment. At one episode of likely atrial fibrillation about 9 months ago which resolved itself. Otherwise patient has no symptoms. He denies any symptoms concerning for angina or heart failure. Denies PND or any lower extremity swelling. Taking all his medication without any side effects. Denies any nausea, vomiting, abdominal pain, fever, chills, sputum production. No hematuria or other bleeding complaints    Past Medical History:   Diagnosis Date    Atrial fibrillation ([address] 04/2016    S/P AMPARO CARDIOVERFSION (12/16)    HTN (hypertension)     Obesity          Past Surgical History:   Procedure Laterality Date    HX APPENDECTOMY         Current Outpatient Medications   Medication Sig    Xarelto 20 mg tab tablet take 1 tablet by mouth once daily with dinner    amiodarone (CORDARONE) 200 mg tablet TAKE 1 TABLET DAILY    metoprolol succinate (TOPROL-XL) 50 mg XL tablet Take 50 mg by mouth daily. No current facility-administered medications for this visit. Allergies and Sensitivities:  Allergies   Allergen Reactions    Aleve [Naproxen Sodium] Vertigo    Flagyl [Metronidazole] Vertigo       Family History:  Family History   Problem Relation Age of Onset   [de-identified] Cancer Mother     Heart Disease Mother     Dementia Father        Social History:  Social History     Tobacco Use    Smoking status: Never Smoker    Smokeless tobacco: Never Used   Substance Use Topics    Alcohol use: No    Drug use: No     He  reports that he has never smoked. He has never used smokeless tobacco.  He  reports no history of alcohol use. Review of Systems:  Cardiac symptoms as noted above in HPI. All others negative.   Physical Exam:  BP Readings from Last 3 Encounters:   11/23/20 (!) 142/88   11/26/18 (!) 155/97   08/15/17 (!) 157/96         Pulse Readings from Last 3 Encounters:   11/23/20 63   11/26/18 61   08/15/17 62          Wt Readings from Last 3 Encounters:   11/23/20 270 lb 9.6 oz (122.7 kg)   11/26/18 265 lb (120.2 kg)   08/15/17 275 lb (124.7 kg)     Constitutional: Oriented to person, place, and time. HENT: Head: Normocephalic and atraumatic. Neck: No JVD present. Cardiovascular: Regular rhythm. No murmur, gallop or rubs appreciated  Lung: Breath sounds normal. No respiratory distress. No ronchi or rales appreciated  Abdominal: No tenderness. No rebound and no guarding. Musculoskeletal: There is no lower extremity edema. No cynosis    Review of Data  LABS:   Lab Results   Component Value Date/Time    Sodium 138 12/01/2016 09:38 AM    Potassium 4.6 12/01/2016 09:38 AM    Chloride 99 12/01/2016 09:38 AM    CO2 27 12/01/2016 09:38 AM    Glucose 119 (H) 12/01/2016 09:38 AM    BUN 14 12/01/2016 09:38 AM    Creatinine 0.99 12/01/2016 09:38 AM     No flowsheet data found. Lab Results   Component Value Date/Time    ALT (SGPT) 39 04/20/2016 01:15 PM     No results found for: HBA1C, HGBE8, YYR8AISB, PRZ7EOGD, JRG2LAML    (06/17)   , TG 66, , HDL 43. TSH 1.9  AST 16, ALT 21      EKG  (05/16) Sinus rhythm at 68 bpm. Normal NJ and QRS interval. No ST changes of ischemia. (12/16) A.fib at 88 bpm.   (01/17) Sinus bradycardia at 57 bp    ECHO (04/16)  Left ventricle: Systolic function was at the lower limits of normal.  Ejection fraction was estimated to be 50 %. No obvious wall motion  abnormalities identified in the views obtained. Wall thickness was mildly  increased. There was mild concentric hypertrophy. The study was not  technically sufficient to allow evaluation of LV diastolic function. Right ventricle: Systolic function was normal.  Mitral valve: There was trivial regurgitation. Aortic valve: There was no stenosis.     IMPRESSION & PLAN:  Mr. Pratik Paz is a 63 y.o.d male with paroxysmal atrial fibrillation, hypertension, obesity. Atrial fibrillation:    Paroxysmal a.fib   AMPARO cardioversion (12/16) was successful and back in NSR  But on 12/20/16, back in a.fib. Started on amiodarone on 12/21/16 and now back in NSR again on exam since then  Continue amiodarone to maintain rhythm. Currently taking amiodarone every other day. Reasonable to consider slowly taper down into every third day  He has appointment with his eye doctor in 1 week. Will check CMP and TSH  Continue xarelto. If remains in sinus rhythm, may consider multaq if any concern with side effects from amiodarone and may consider DC xarelto in future. Hypertension: Pressure stable. Continue current medication    Obesity: Today his weight is 275-->241--> 270 pounds. His weight is up. He is going to start exercise program again. This plan was discussed with patient who is in agreement. Thank you for allowing me to participate in patient care. Please feel free to call me if you have any question or concern. Frances Grossman MD  Please note: This document has been produced using voice recognition software. Unrecognized errors in transcription may be present.

## 2020-11-24 LAB
ALBUMIN SERPL-MCNC: 4.4 G/DL (ref 3.8–4.9)
ALBUMIN/GLOB SERPL: 1.8 {RATIO} (ref 1.2–2.2)
ALP SERPL-CCNC: 60 IU/L (ref 39–117)
ALT SERPL-CCNC: 25 IU/L (ref 0–44)
AST SERPL-CCNC: 20 IU/L (ref 0–40)
BILIRUB SERPL-MCNC: 0.7 MG/DL (ref 0–1.2)
BUN SERPL-MCNC: 11 MG/DL (ref 6–24)
BUN/CREAT SERPL: 11 (ref 9–20)
CALCIUM SERPL-MCNC: 9.7 MG/DL (ref 8.7–10.2)
CHLORIDE SERPL-SCNC: 100 MMOL/L (ref 96–106)
CO2 SERPL-SCNC: 27 MMOL/L (ref 20–29)
CREAT SERPL-MCNC: 1.02 MG/DL (ref 0.76–1.27)
GLOBULIN SER CALC-MCNC: 2.5 G/DL (ref 1.5–4.5)
GLUCOSE SERPL-MCNC: 81 MG/DL (ref 65–99)
POTASSIUM SERPL-SCNC: 4.6 MMOL/L (ref 3.5–5.2)
PROT SERPL-MCNC: 6.9 G/DL (ref 6–8.5)
SODIUM SERPL-SCNC: 138 MMOL/L (ref 134–144)
T4 FREE SERPL-MCNC: 1.09 NG/DL (ref 0.82–1.77)
TSH SERPL DL<=0.005 MIU/L-ACNC: 3.26 UIU/ML (ref 0.45–4.5)

## 2020-12-11 RX ORDER — METOPROLOL SUCCINATE 50 MG/1
50 TABLET, EXTENDED RELEASE ORAL DAILY
Qty: 30 TAB | Refills: 1 | Status: SHIPPED | OUTPATIENT
Start: 2020-12-11 | End: 2021-03-04 | Stop reason: SDUPTHER

## 2021-03-04 NOTE — TELEPHONE ENCOUNTER
Requested Prescriptions     Pending Prescriptions Disp Refills    metoprolol succinate (TOPROL-XL) 50 mg XL tablet       Sig: Take 1 Tab by mouth daily.      90 day mailorder pharmacy order

## 2021-03-04 NOTE — TELEPHONE ENCOUNTER
Requested Prescriptions     Pending Prescriptions Disp Refills    metoprolol succinate (TOPROL-XL) 50 mg XL tablet 30 Tab 1     Sig: Take 1 Tab by mouth daily.        30 day local pharmacy fill

## 2021-03-05 RX ORDER — METOPROLOL SUCCINATE 50 MG/1
50 TABLET, EXTENDED RELEASE ORAL DAILY
Qty: 90 TAB | Refills: 3 | Status: SHIPPED | OUTPATIENT
Start: 2021-03-05 | End: 2021-06-01 | Stop reason: ALTCHOICE

## 2021-03-05 RX ORDER — METOPROLOL SUCCINATE 50 MG/1
50 TABLET, EXTENDED RELEASE ORAL DAILY
Qty: 30 TAB | Refills: 1 | Status: SHIPPED | OUTPATIENT
Start: 2021-03-05 | End: 2022-04-13 | Stop reason: SDUPTHER

## 2021-03-29 RX ORDER — AMIODARONE HYDROCHLORIDE 200 MG/1
200 TABLET ORAL
Qty: 60 TAB | Refills: 1 | Status: SHIPPED | OUTPATIENT
Start: 2021-03-29 | End: 2021-06-03 | Stop reason: SDUPTHER

## 2021-03-29 RX ORDER — AMIODARONE HYDROCHLORIDE 200 MG/1
200 TABLET ORAL
Qty: 5 TAB | Refills: 0 | Status: SHIPPED | OUTPATIENT
Start: 2021-03-29 | End: 2021-04-02

## 2021-03-29 NOTE — TELEPHONE ENCOUNTER
PCP: Tracee Florez MD    Last appt: 11/23/2020  No future appointments. Requested Prescriptions     Pending Prescriptions Disp Refills    amiodarone (CORDARONE) 200 mg tablet 5 Tab 0     Sig: Take 1 Tab by mouth every three (3) days. .       Request for a 30 or 90 day supply? Provider Discretion    Pharmacy: Confirmed    Other Comments:  Medication refill request via phone. Patient request emergency supply be sent to pharmacy since mail order would take longer. Patient out of medication.

## 2021-03-29 NOTE — TELEPHONE ENCOUNTER
PCP: Emmanuel Clancy MD    Last appt: 11/23/2020  No future appointments. Requested Prescriptions     Pending Prescriptions Disp Refills    amiodarone (CORDARONE) 200 mg tablet 60 Tab 1     Sig: Take 1 Tab by mouth every three (3) days. .       Request for a 30 or 90 day supply? Provider Discretion    Pharmacy: Confirmed    Other Comments:  Medication refill request via phone.

## 2021-04-02 RX ORDER — AMIODARONE HYDROCHLORIDE 200 MG/1
TABLET ORAL
Qty: 5 TAB | Refills: 0 | Status: SHIPPED | OUTPATIENT
Start: 2021-04-02 | End: 2021-06-01

## 2021-04-20 RX ORDER — RIVAROXABAN 20 MG/1
TABLET, FILM COATED ORAL
Qty: 30 TAB | Refills: 6 | Status: SHIPPED | OUTPATIENT
Start: 2021-04-20 | End: 2021-12-06

## 2021-05-24 ENCOUNTER — TELEPHONE (OUTPATIENT)
Dept: CARDIOLOGY CLINIC | Age: 56
End: 2021-05-24

## 2021-05-24 NOTE — TELEPHONE ENCOUNTER
[facility]. Two patient Identifiers confirmed. Advised pt appt required. Pt verbalized understanding and scheduled for 06/01/21.

## 2021-06-01 ENCOUNTER — OFFICE VISIT (OUTPATIENT)
Dept: CARDIOLOGY CLINIC | Age: 56
End: 2021-06-01
Payer: COMMERCIAL

## 2021-06-01 VITALS
WEIGHT: 261.8 LBS | HEART RATE: 99 BPM | TEMPERATURE: 98.1 F | OXYGEN SATURATION: 99 % | HEIGHT: 75 IN | RESPIRATION RATE: 16 BRPM | DIASTOLIC BLOOD PRESSURE: 91 MMHG | SYSTOLIC BLOOD PRESSURE: 139 MMHG | BODY MASS INDEX: 32.55 KG/M2

## 2021-06-01 DIAGNOSIS — I48.0 PAF (PAROXYSMAL ATRIAL FIBRILLATION) (HCC): Primary | ICD-10-CM

## 2021-06-01 DIAGNOSIS — R06.09 DOE (DYSPNEA ON EXERTION): ICD-10-CM

## 2021-06-01 PROCEDURE — 93000 ELECTROCARDIOGRAM COMPLETE: CPT | Performed by: INTERNAL MEDICINE

## 2021-06-01 PROCEDURE — 99214 OFFICE O/P EST MOD 30 MIN: CPT | Performed by: INTERNAL MEDICINE

## 2021-06-01 NOTE — LETTER
6/1/2021 Patient: Robert Das YOB: 1965 Date of Visit: 6/1/2021 Cheyenne Barrios MD 
[address] [place] [address] Via Fax: [phone] Dear Cheyenne Barrios MD, Thank you for referring Mr. Ilir Macdonald to [facility]IST AT [facility] for evaluation. My notes for this consultation are attached. If you have questions, please do not hesitate to call me. I look forward to following your patient along with you. Sincerely, Jose Dinh MD

## 2021-06-01 NOTE — PROGRESS NOTES
[facility]    Mr. Temo Mace is a 63 y.o.male with history of paroxysmal atrial fibrillation s/p cardioversion 12/16, hypertension and obesity. Mr. Temo Mace is here today for follow up appointment. For last 2 weeks, patient has been feeling that he has been back in atrial fibrillation. He does not feel palpitation however he can tell that his pulse has been erratic and he gets short of breath. He denies any lower smell swelling. He denies any anginal symptoms. He tried to take higher dose of amiodarone without any success. He denies any presyncope or syncope  Denies any nausea, vomiting, abdominal pain, fever, chills, sputum production. No hematuria or other bleeding complaints    Past Medical History:   Diagnosis Date    Atrial fibrillation ([address] 04/2016    S/P AMPARO CARDIOVERFSION (12/16)    HTN (hypertension)     Obesity          Past Surgical History:   Procedure Laterality Date    HX APPENDECTOMY         Current Outpatient Medications   Medication Sig    Xarelto 20 mg tab tablet take 1 tablet by mouth once daily with dinner    metoprolol succinate (TOPROL-XL) 50 mg XL tablet Take 1 Tab by mouth daily.  amiodarone (CORDARONE) 200 mg tablet Take 1 Tab by mouth every three (3) days. .     No current facility-administered medications for this visit. Allergies and Sensitivities:  Allergies   Allergen Reactions    Aleve [Naproxen Sodium] Vertigo    Flagyl [Metronidazole] Vertigo       Family History:  Family History   Problem Relation Age of Onset   Zina Gaines Cancer Mother     Heart Disease Mother     Dementia Father        Social History:  Social History     Tobacco Use    Smoking status: Never Smoker    Smokeless tobacco: Never Used   Substance Use Topics    Alcohol use: No    Drug use: No     He  reports that he has never smoked. He has never used smokeless tobacco.  He  reports no history of alcohol use.     Review of Systems:  Cardiac symptoms as noted above in HPI. All others negative. Physical Exam:  BP Readings from Last 3 Encounters:   06/01/21 (!) 139/91   11/23/20 (!) 142/88   11/26/18 (!) 155/97         Pulse Readings from Last 3 Encounters:   06/01/21 99   11/23/20 63   11/26/18 61          Wt Readings from Last 3 Encounters:   06/01/21 118.8 kg (261 lb 12.8 oz)   11/23/20 122.7 kg (270 lb 9.6 oz)   11/26/18 120.2 kg (265 lb)     Constitutional: Oriented to person, place, and time. HENT: Head: Normocephalic and atraumatic. Neck: No JVD present. Cardiovascular: Regular rhythm. No murmur, gallop or rubs appreciated  Lung: Breath sounds normal. No respiratory distress. No ronchi or rales appreciated  Abdominal: No tenderness. No rebound and no guarding. Musculoskeletal: There is no lower extremity edema. No cynosis    Review of Data  LABS:   Lab Results   Component Value Date/Time    Sodium 138 11/23/2020 12:00 AM    Potassium 4.6 11/23/2020 12:00 AM    Chloride 100 11/23/2020 12:00 AM    CO2 27 11/23/2020 12:00 AM    Glucose 81 11/23/2020 12:00 AM    BUN 11 11/23/2020 12:00 AM    Creatinine 1.02 11/23/2020 12:00 AM     No flowsheet data found. Lab Results   Component Value Date/Time    ALT (SGPT) 25 11/23/2020 12:00 AM     No results found for: HBA1C, AFZ1NBQU, OEH0IUBP, XAP9JVFC    (06/17)   , TG 66, , HDL 43. TSH 1.9  AST 16, ALT 21      EKG  (05/16) Sinus rhythm at 68 bpm. Normal SC and QRS interval. No ST changes of ischemia. (12/16) A.fib at 88 bpm.   (01/17) Sinus bradycardia at 57 bp    ECHO (04/16)  Left ventricle: Systolic function was at the lower limits of normal.  Ejection fraction was estimated to be 50 %. No obvious wall motion  abnormalities identified in the views obtained. Wall thickness was mildly  increased. There was mild concentric hypertrophy. The study was not  technically sufficient to allow evaluation of LV diastolic function.     IMPRESSION & PLAN:   Lori Ambriznt is a 63 y.o.d male with paroxysmal atrial fibrillation, hypertension, obesity. Atrial fibrillation:    Paroxysmal a.fib   AMPARO cardioversion (12/16) was successful and back in NSR  But on 12/20/16, back in a.fib. Started on amiodarone on 12/21/16 and now back in NSR again on exam since then  For last 2 weeks he has been feeling some shortness of breath and feels like his pulse has been erratic  We will check EKG today. On exam feels like he may be in sinus rhythm. Continue xarelto. Will increase amiodarone to 200 mg twice a day for 1 week and then 200 mg once a day. Will order echo to make sure he does not have any A. fib related cardiomyopathy. Hypertension: Pressure stable. Continue current medication    Obesity: Today his weight is 275-->241--> 261 pounds. This plan was discussed with patient who is in agreement. Thank you for allowing me to participate in patient care. Please feel free to call me if you have any question or concern. Ana Matthews MD  Please note: This document has been produced using voice recognition software. Unrecognized errors in transcription may be present.

## 2021-06-01 NOTE — PROGRESS NOTES
Shannon Razo presents today for   Chief Complaint   Patient presents with    Follow-up     [place]       Shannon Razo preferred language for health care discussion is english/other. Personal Protective Equipment:   Personal Protective Equipment was used including: mask-surgical and hands-gloves. Patient was placed on no precaution(s). Patient was masked. Precautions:   Patient currently on None  Patient currently roomed with door closed    Is someone accompanying this pt? no    Is the patient using any DME equipment during [address]? no    Depression Screening:  3 most recent PHQ Screens 6/1/2021   Little interest or pleasure in doing things Not at all   Feeling down, depressed, irritable, or hopeless Not at all   Total Score PHQ 2 0       Learning Assessment:  Learning Assessment 5/12/2016   PRIMARY LEARNER Patient   PRIMARY LANGUAGE ENGLISH   LEARNER PREFERENCE PRIMARY DEMONSTRATION   ANSWERED BY Patient   RELATIONSHIP SELF       Abuse Screening:  No flowsheet data found. Fall Risk  No flowsheet data found. Pt currently taking Anticoagulant therapy? no    Coordination of Care:  1. Have you been to the ER, urgent care clinic since your last visit? Hospitalized since your last visit? no    2. Have you seen or consulted any other health care providers outside of the [address] [place] [address] since your last visit? Include any pap smears or colon screening.  no

## 2021-06-01 NOTE — PATIENT INSTRUCTIONS
Testing Echo 
 
**call office 3-5 days after testing is completed for results** New Medication/Medication Changes Amiodarone 200 mg twice a day  x 7 days then decrease to taking amiodarone 200 mg once daily ** If you need any refills on medications please contact your pharmacy so that the request can be escribed to the provider for review.

## 2021-06-03 RX ORDER — AMIODARONE HYDROCHLORIDE 200 MG/1
200 TABLET ORAL DAILY
Qty: 90 TABLET | Refills: 3 | Status: SHIPPED | OUTPATIENT
Start: 2021-06-03 | End: 2021-12-17 | Stop reason: SDUPTHER

## 2021-06-03 NOTE — TELEPHONE ENCOUNTER
Requested Prescriptions     Pending Prescriptions Disp Refills    amiodarone (CORDARONE) 200 mg tablet 60 Tablet 1     Sig: Take 1 Tablet by mouth every three (3) days.  .     Patient stated he was to get refill at last OV

## 2021-06-11 ENCOUNTER — TELEPHONE (OUTPATIENT)
Dept: CARDIOLOGY CLINIC | Age: 56
End: 2021-06-11

## 2021-06-11 NOTE — TELEPHONE ENCOUNTER
Pt called stating that hes back in a fib. Pt is currently taking amiodarone 200 mg 1 po every day. I spoke to dr Mary Alice Elliott regarding. Per dr Mary Alice Elliott: pt needs to take amiodarone 200 mg 1 po bid, bring pt in for visit and ekg NEXT week. appt time 2:30 pm 6/15/21  Attempted to contact pt at  number, no answer. Lvm for pt to return call to office at [phone]. Will continue to try to contact pt.

## 2021-06-14 NOTE — TELEPHONE ENCOUNTER
Attempted to contact pt at  number, no answer. Lvm for pt to return call to office at [phone]. Will continue to try to contact pt.

## 2021-06-15 ENCOUNTER — OFFICE VISIT (OUTPATIENT)
Dept: CARDIOLOGY CLINIC | Age: 56
End: 2021-06-15
Payer: COMMERCIAL

## 2021-06-15 VITALS
BODY MASS INDEX: 32.45 KG/M2 | HEART RATE: 61 BPM | OXYGEN SATURATION: 99 % | SYSTOLIC BLOOD PRESSURE: 146 MMHG | HEIGHT: 75 IN | WEIGHT: 261 LBS | DIASTOLIC BLOOD PRESSURE: 91 MMHG

## 2021-06-15 DIAGNOSIS — I48.20 CHRONIC ATRIAL FIBRILLATION (HCC): Primary | ICD-10-CM

## 2021-06-15 PROCEDURE — 99213 OFFICE O/P EST LOW 20 MIN: CPT | Performed by: INTERNAL MEDICINE

## 2021-06-15 NOTE — PATIENT INSTRUCTIONS
Testing   Echo    New Medication/Medication Changes  AMIODARONE 200 MG 1 TAB TWICE DAILY X 1 WEEK THEN ONCE DAILY    **please allow 24-48 hrs for medication to be escribed to pharmacy** If you need any refills on medications please contact your pharmacy so that the request can be escribed to the provider

## 2021-06-15 NOTE — PROGRESS NOTES
Cardiovascular Specialists    Mr. Jose Gallagher is a 63 y.o.male with history of paroxysmal atrial fibrillation s/p cardioversion 12/16, hypertension and obesity. Mr. Jose Gallagher is here today for add-on appointment. Patient had another episode of prolonged atrial fibrillation with rapid ventricle response causing some shortness of breath. Today he says that he feels like he is back in normal rhythm. He is feeling back to normal.  He has been taking amiodarone 200 mg once daily however because of this episode of atrial fibrillation he has increase that to twice a day for last couple days. He denies any presyncope or syncope  Denies any nausea, vomiting, abdominal pain, fever, chills, sputum production. No hematuria or other bleeding complaints    Past Medical History:   Diagnosis Date    Atrial fibrillation ([address] 04/2016    S/P AMPARO CARDIOVERFSION (12/16)    HTN (hypertension)     Obesity          Past Surgical History:   Procedure Laterality Date    HX APPENDECTOMY         Current Outpatient Medications   Medication Sig    amiodarone (CORDARONE) 200 mg tablet Take 1 Tablet by mouth daily. Amairani Glass Xarelto 20 mg tab tablet take 1 tablet by mouth once daily with dinner    metoprolol succinate (TOPROL-XL) 50 mg XL tablet Take 1 Tab by mouth daily. No current facility-administered medications for this visit. Allergies and Sensitivities:  Allergies   Allergen Reactions    Aleve [Naproxen Sodium] Vertigo    Flagyl [Metronidazole] Vertigo       Family History:  Family History   Problem Relation Age of Onset   Chaya Cancer Mother     Heart Disease Mother     Dementia Father        Social History:  Social History     Tobacco Use    Smoking status: Never Smoker    Smokeless tobacco: Never Used   Substance Use Topics    Alcohol use: No    Drug use: No     He  reports that he has never smoked.  He has never used smokeless tobacco.  He  reports no history of alcohol use. Review of Systems:  Cardiac symptoms as noted above in HPI. All others negative. Physical Exam:  BP Readings from Last 3 Encounters:   06/15/21 (!) 146/91   06/01/21 (!) 139/91   11/23/20 (!) 142/88         Pulse Readings from Last 3 Encounters:   06/15/21 61   06/01/21 99   11/23/20 63          Wt Readings from Last 3 Encounters:   06/15/21 118.4 kg (261 lb)   06/01/21 118.8 kg (261 lb 12.8 oz)   11/23/20 122.7 kg (270 lb 9.6 oz)     Constitutional: Oriented to person, place, and time. HENT: Head: Normocephalic and atraumatic. Neck: No JVD present. Cardiovascular: Regular rhythm. No murmur, gallop or rubs appreciated  Lung: Breath sounds normal. No respiratory distress. No ronchi or rales appreciated  Abdominal: No tenderness. No rebound and no guarding. Musculoskeletal: There is no lower extremity edema. No cynosis    Review of Data  LABS:   Lab Results   Component Value Date/Time    Sodium 138 11/23/2020 12:00 AM    Potassium 4.6 11/23/2020 12:00 AM    Chloride 100 11/23/2020 12:00 AM    CO2 27 11/23/2020 12:00 AM    Glucose 81 11/23/2020 12:00 AM    BUN 11 11/23/2020 12:00 AM    Creatinine 1.02 11/23/2020 12:00 AM     No flowsheet data found. Lab Results   Component Value Date/Time    ALT (SGPT) 25 11/23/2020 12:00 AM     No results found for: HBA1C, GOD4LPCY, IGN6VWGH, UCR1DTXG    (06/17)   , TG 66, , HDL 43. TSH 1.9  AST 16, ALT 21      EKG  (05/16) Sinus rhythm at 68 bpm. Normal NM and QRS interval. No ST changes of ischemia. (12/16) A.fib at 88 bpm.   (01/17) Sinus bradycardia at 57 bp  (06/21) atrial fibrillation with nonspecific ST-T changes    ECHO (04/16)  Left ventricle: Systolic function was at the lower limits of normal.  Ejection fraction was estimated to be 50 %. No obvious wall motion  abnormalities identified in the views obtained. Wall thickness was mildly  increased. There was mild concentric hypertrophy.  The study was not  technically sufficient to allow evaluation of LV diastolic function. IMPRESSION & PLAN:  Mr. Danish Horne is a 63 y.o.d male with paroxysmal atrial fibrillation, hypertension, obesity. Atrial fibrillation:    Paroxysmal a.fib   AMPARO cardioversion (12/16) was successful and back in NSR  But on 12/20/16, back in a.fib. Started on amiodarone on 12/21/16   For last 2 weeks he has been feeling some shortness of breath and feels like his pulse has been erratic  On exam he is back in sinus rhythm. Dose of amiodarone has been increased. I have asked him to take amiodarone to her milligrams twice daily for next 1 week and then reduce back to once a day. Continue xarelto. Echo has been ordered and it is supposed to be done in 2 weeks. Different antiarrhythmic medication and ablation was discussed. For now would like to continue with amiodarone. Last TSH, AST and ALT were checked in 11/2020 and they were normal    Hypertension: Pressure stable. Continue current medication    Obesity: Today his weight is 275-->241--> 261 pounds. This plan was discussed with patient who is in agreement. Thank you for allowing me to participate in patient care. Please feel free to call me if you have any question or concern. Panda Wise MD  Please note: This document has been produced using voice recognition software. Unrecognized errors in transcription may be present.

## 2021-12-06 RX ORDER — RIVAROXABAN 20 MG/1
TABLET, FILM COATED ORAL
Qty: 30 TABLET | Refills: 6 | Status: SHIPPED | OUTPATIENT
Start: 2021-12-06 | End: 2022-07-17

## 2021-12-17 NOTE — TELEPHONE ENCOUNTER
PCP: Regla Arthur MD    Last appt: 12/16/2021  No future appointments. Requested Prescriptions     Pending Prescriptions Disp Refills    amiodarone (CORDARONE) 200 mg tablet 90 Tablet 3     Sig: Take 1 Tablet by mouth daily. Marivel Hadley

## 2021-12-20 RX ORDER — AMIODARONE HYDROCHLORIDE 200 MG/1
200 TABLET ORAL DAILY
Qty: 90 TABLET | Refills: 3 | Status: SHIPPED | OUTPATIENT
Start: 2021-12-20 | End: 2022-06-14 | Stop reason: SDUPTHER

## 2022-04-13 RX ORDER — METOPROLOL SUCCINATE 50 MG/1
50 TABLET, EXTENDED RELEASE ORAL DAILY
Qty: 7 TABLET | Refills: 1 | Status: SHIPPED | OUTPATIENT
Start: 2022-04-13 | End: 2022-04-27 | Stop reason: SDUPTHER

## 2022-04-13 NOTE — TELEPHONE ENCOUNTER
PCP: William Pride MD    Last appt: 12/16/2021  Future Appointments   Date Time Provider Kathi Dominguez   6/14/2022 11:00 AM Cezar Neville MD [facility] BS AMB       Requested Prescriptions      No prescriptions requested or ordered in this encounter

## 2022-04-13 NOTE — TELEPHONE ENCOUNTER
PCP: Lavinia Balderas MD    Last appt: 12/16/2021  Future Appointments   Date Time Provider Kathi Dominguez   6/14/2022 11:00 AM Gerardo Hutton MD [facility] BS AMB       Requested Prescriptions      No prescriptions requested or ordered in this encounter       Other Comments: 1 week temporary Rx until mail order is sent

## 2022-04-19 RX ORDER — METOPROLOL SUCCINATE 50 MG/1
50 TABLET, EXTENDED RELEASE ORAL DAILY
Qty: 90 TABLET | Refills: 3 | OUTPATIENT
Start: 2022-04-19

## 2022-04-27 RX ORDER — METOPROLOL SUCCINATE 50 MG/1
50 TABLET, EXTENDED RELEASE ORAL DAILY
Qty: 30 TABLET | Refills: 1 | Status: SHIPPED | OUTPATIENT
Start: 2022-04-27 | End: 2022-04-28 | Stop reason: SDUPTHER

## 2022-04-27 NOTE — TELEPHONE ENCOUNTER
PCP: Manuel Jama MD    Last appt: 12/16/2021  Future Appointments   Date Time Provider Kathi Dominguez   6/14/2022 11:00 AM Jose R Swift MD [facility] BS AMB       Requested Prescriptions      No prescriptions requested or ordered in this encounter

## 2022-04-28 NOTE — TELEPHONE ENCOUNTER
PCP: Nolberto Figueroa MD    Last appt: 12/16/2021  Future Appointments   Date Time Provider Kathi Dominguez   6/14/2022 11:00 AM Marek Carranza MD [facility] BS AMB       Requested Prescriptions      No prescriptions requested or ordered in this encounter     PT needs 90 day supply

## 2022-04-29 RX ORDER — METOPROLOL SUCCINATE 50 MG/1
50 TABLET, EXTENDED RELEASE ORAL DAILY
Qty: 90 TABLET | Refills: 3 | Status: SHIPPED | OUTPATIENT
Start: 2022-04-29 | End: 2022-06-14 | Stop reason: SDUPTHER

## 2022-05-11 RX ORDER — LOSARTAN POTASSIUM 25 MG/1
25 TABLET ORAL DAILY
COMMUNITY
End: 2022-06-14 | Stop reason: SDUPTHER

## 2022-05-11 NOTE — TELEPHONE ENCOUNTER
PCP: Neida Valverde MD    Last appt: 12/16/2021  Future Appointments   Date Time Provider Kathi Dominguez   6/14/2022 11:00 AM Darrion Spivey MD [facility] BS AMB       Requested Prescriptions      No prescriptions requested or ordered in this encounter

## 2022-06-14 ENCOUNTER — OFFICE VISIT (OUTPATIENT)
Dept: CARDIOLOGY CLINIC | Age: 57
End: 2022-06-14
Payer: COMMERCIAL

## 2022-06-14 VITALS
SYSTOLIC BLOOD PRESSURE: 124 MMHG | WEIGHT: 276 LBS | BODY MASS INDEX: 34.5 KG/M2 | TEMPERATURE: 98 F | HEART RATE: 61 BPM | DIASTOLIC BLOOD PRESSURE: 88 MMHG | OXYGEN SATURATION: 97 %

## 2022-06-14 DIAGNOSIS — I48.0 PAF (PAROXYSMAL ATRIAL FIBRILLATION) (HCC): Primary | ICD-10-CM

## 2022-06-14 PROCEDURE — 93000 ELECTROCARDIOGRAM COMPLETE: CPT | Performed by: INTERNAL MEDICINE

## 2022-06-14 PROCEDURE — 99213 OFFICE O/P EST LOW 20 MIN: CPT | Performed by: INTERNAL MEDICINE

## 2022-06-14 RX ORDER — AMIODARONE HYDROCHLORIDE 200 MG/1
200 TABLET ORAL DAILY
Qty: 90 TABLET | Refills: 3 | Status: SHIPPED | OUTPATIENT
Start: 2022-06-14

## 2022-06-14 RX ORDER — LOSARTAN POTASSIUM 25 MG/1
25 TABLET ORAL DAILY
Qty: 90 TABLET | Refills: 3 | Status: SHIPPED | OUTPATIENT
Start: 2022-06-14

## 2022-06-14 RX ORDER — METOPROLOL SUCCINATE 50 MG/1
50 TABLET, EXTENDED RELEASE ORAL DAILY
Qty: 90 TABLET | Refills: 3 | Status: SHIPPED | OUTPATIENT
Start: 2022-06-14

## 2022-06-14 NOTE — PROGRESS NOTES
Cardiovascular Specialists    Mr. Claudean Overland is a 61 y. o.male with history of paroxysmal atrial fibrillation s/p cardioversion 12/16, hypertension and obesity. Mr. Claudean Overland is here today for add-on appointment for paroxysmal atrial fibrillation and hypertension  Patient is doing fairly well since last visit. Patient appears to have maybe 1 at the most 2 episode of short-lived atrial fibrillation since last visit 1 year ago. Denies any symptoms concerning for angina or heart failure. No lower extremity swelling. Denies palpitation, presyncope or syncope. Taking medication regularly. Denies any nausea, vomiting, abdominal pain, fever, chills, sputum production. No hematuria or other bleeding complaints    Past Medical History:   Diagnosis Date    Atrial fibrillation ([address] 04/2016    S/P AMPARO CARDIOVERFSION (12/16)    HTN (hypertension)     Obesity          Past Surgical History:   Procedure Laterality Date    HX APPENDECTOMY         Current Outpatient Medications   Medication Sig    losartan (COZAAR) 25 mg tablet Take 1 Tablet by mouth daily.  metoprolol succinate (TOPROL-XL) 50 mg XL tablet Take 1 Tablet by mouth daily.  amiodarone (CORDARONE) 200 mg tablet Take 1 Tablet by mouth daily. Angel Stoneham Xarelto 20 mg tab tablet take 1 tablet by mouth once daily with dinner     No current facility-administered medications for this visit. Allergies and Sensitivities:  Allergies   Allergen Reactions    Aleve [Naproxen Sodium] Vertigo    Flagyl [Metronidazole] Vertigo       Family History:  Family History   Problem Relation Age of Onset   [facility] Cancer Mother     Heart Disease Mother     Dementia Father        Social History:  Social History     Tobacco Use    Smoking status: Never Smoker    Smokeless tobacco: Never Used   Substance Use Topics    Alcohol use: No    Drug use: No     He  reports that he has never smoked.  He has never used smokeless tobacco.  He  reports no history of alcohol use. Review of Systems:  Cardiac symptoms as noted above in HPI. All others negative. Physical Exam:  BP Readings from Last 3 Encounters:   06/14/22 124/88   06/30/21 (!) 153/91   06/15/21 (!) 146/91         Pulse Readings from Last 3 Encounters:   06/14/22 61   06/15/21 61   06/01/21 99          Wt Readings from Last 3 Encounters:   06/14/22 125.2 kg (276 lb)   06/30/21 118.4 kg (261 lb)   06/15/21 118.4 kg (261 lb)     Constitutional: Oriented to person, place, and time. HENT: Head: Normocephalic and atraumatic. Neck: No JVD present. Cardiovascular: Regular rhythm. No murmur, gallop or rubs appreciated  Lung: Breath sounds normal. No respiratory distress. No ronchi or rales appreciated  Abdominal: No tenderness. No rebound and no guarding. Musculoskeletal: There is no lower extremity edema. No cynosis    Review of Data  LABS:   Lab Results   Component Value Date/Time    Sodium 138 11/23/2020 12:00 AM    Potassium 4.6 11/23/2020 12:00 AM    Chloride 100 11/23/2020 12:00 AM    CO2 27 11/23/2020 12:00 AM    Glucose 81 11/23/2020 12:00 AM    BUN 11 11/23/2020 12:00 AM    Creatinine 1.02 11/23/2020 12:00 AM     No flowsheet data found. Lab Results   Component Value Date/Time    ALT (SGPT) 25 11/23/2020 12:00 AM     No results found for: HBA1C, XCD2CWOW, ILW3NKMT, QUZ8YGVM    (06/17)   , TG 66, , HDL 43. TSH 1.9  AST 16, ALT 21      EKG  (05/16) Sinus rhythm at 68 bpm. Normal UT and QRS interval. No ST changes of ischemia. (12/16) A.fib at 88 bpm.   (01/17) Sinus bradycardia at 57 bp  (06/21) atrial fibrillation with nonspecific ST-T changes    ECHO ADULT COMPLETE 07/01/2021 7/1/2021  Interpretation Summary  · LV: Estimated LVEF is 55 - 60%. Visually measured ejection fraction. Normal cavity size and systolic function (ejection fraction normal). Septal asymmetric hypertrophy. Mild septal wall hypertrophy.  Mild (grade 1) left ventricular diastolic dysfunction. · LA: Moderately dilated left atrium. Left Atrium volume index is 52.39 mL/m2. · AO: Mild aortic root dilatation. · MV: Mitral valve non-specific thickening. IMPRESSION & PLAN:  Mr. Isabel Boucher is a 61 y.o.d male with paroxysmal atrial fibrillation, hypertension, obesity. Atrial fibrillation:    Paroxysmal a.fib   AMPARO cardioversion (12/16) was successful and back in NSR  But on 12/20/16, back in a.fib. Started on amiodarone on 12/21/16   Rare episode of atrial fibrillation. Felt that he may be in atrial fibrillation twice over last 1 year. Continue xarelto. Hypertension: /86 mm Hg. Continue current medication    Obesity: Today his weight is 275-->241-->276 pounds. Importance of dietary modification and exercise advised. He admits that he follow on healthy dietary lifestyle. This plan was discussed with patient who is in agreement. Thank you for allowing me to participate in patient care. Please feel free to call me if you have any question or concern. Sid Gordon MD  Please note: This document has been produced using voice recognition software. Unrecognized errors in transcription may be present.

## 2022-06-14 NOTE — PROGRESS NOTES
Identified pt with two pt identifiers(name and ). Reviewed record in preparation for visit and have obtained necessary documentation. Yoon Darnell presents today for No chief complaint on file. Pt denies DIZZINESS, SOB, CHEST PAIN/ PRESSURE, FATIGUE/WEAKNESS, HEADACHES, SWELLING. Yoon Darnell preferred language for health care discussion is english/other. Personal Protective Equipment:   Personal Protective Equipment was used including: mask-surgical and hands-gloves. Patient was placed on no precaution(s). Patient was masked. Precautions:   Patient currently on None  Patient currently roomed with door closed. Is someone accompanying this pt? yes    Is the patient using any DME equipment during OV? Yes     Depression Screening:  3 most recent PHQ Screens 6/15/2021   Little interest or pleasure in doing things Not at all   Feeling down, depressed, irritable, or hopeless Not at all   Total Score PHQ 2 0       Learning Assessment:  Learning Assessment 2016   PRIMARY LEARNER Patient   PRIMARY LANGUAGE ENGLISH   LEARNER PREFERENCE PRIMARY DEMONSTRATION   ANSWERED BY Patient   RELATIONSHIP SELF       Abuse Screening:  Abuse Screening Questionnaire 6/15/2021   Do you ever feel afraid of your partner? N   Are you in a relationship with someone who physically or mentally threatens you? N   Is it safe for you to go home? Y       Fall Risk  No flowsheet data found. Pt currently taking Anticoagulant therapy? Yes. Pt currently taking Antiplatelet therapy? No     Coordination of Care:  1. Have you been to the ER, urgent care clinic since your last visit? Hospitalized since your last visit? No     2. Have you seen or consulted any other health care providers outside of the [address] [place] [address] since your last visit? Include any pap smears or colon screening. No       Please see Red banners under Allergies and Med Rec to remove outside inquires.  All correct information has been verified with patient and added to chart.      Medication's patient's would liked removed has been marked not taking to be removed per Verbal order and read back per Evelyn Rice MD

## 2022-07-17 RX ORDER — RIVAROXABAN 20 MG/1
TABLET, FILM COATED ORAL
Qty: 30 TABLET | Refills: 6 | Status: SHIPPED | OUTPATIENT
Start: 2022-07-17

## 2023-02-20 RX ORDER — RIVAROXABAN 20 MG/1
TABLET, FILM COATED ORAL
Qty: 30 TABLET | Refills: 4 | Status: SHIPPED | OUTPATIENT
Start: 2023-02-20

## 2023-02-20 NOTE — TELEPHONE ENCOUNTER
PCP: Amanda Jenkins MD    Last appt: [unfilled]  Future Appointments   Date Time Provider Patricia Decker   6/15/2023 11:15 AM Hari Davila MD [facility] BS AMB       Requested Prescriptions     Pending Prescriptions Disp Refills    XARELTO 20 MG TABS tablet [Pharmacy Med Name: Johan Marquez 20 MG TABLET] 30 tablet      Sig: take 1 tablet by mouth once daily with dinner

## 2023-04-20 ENCOUNTER — TELEPHONE (OUTPATIENT)
Age: 58
End: 2023-04-20

## 2023-04-20 NOTE — TELEPHONE ENCOUNTER
Patient came into office and asked if he could have his prescription for XARELTO 20 MG TABS tablet filled for 90 days because he is going to be out of town for a whole month and would like to have enough to last him while he is out of town his callback number is [phone] or [phone]. Please advise.

## 2023-04-24 NOTE — TELEPHONE ENCOUNTER
PCP: Luis F Villanueva MD    Last appt: [unfilled]  Future Appointments   Date Time Provider Patricia Decker   6/15/2023 11:15 AM Hari Lehman MD [facility] BS AMB       Requested Prescriptions     Pending Prescriptions Disp Refills    rivaroxaban (XARELTO) 20 MG TABS tablet 90 tablet 2     Sig: take 1 tablet by mouth once daily with dinner

## 2023-06-22 ENCOUNTER — HOSPITAL ENCOUNTER (OUTPATIENT)
Facility: HOSPITAL | Age: 58
Discharge: HOME | End: 2023-06-22
Attending: INTERNAL MEDICINE
Payer: COMMERCIAL

## 2023-06-22 DIAGNOSIS — Z79.899 LONG TERM CURRENT USE OF AMIODARONE: ICD-10-CM

## 2023-06-22 PROCEDURE — 71250 CT THORAX DX C-: CPT

## 2023-07-05 ENCOUNTER — TELEPHONE (OUTPATIENT)
Age: 58
End: 2023-07-05

## 2023-07-05 RX ORDER — METOPROLOL SUCCINATE 50 MG/1
50 TABLET, EXTENDED RELEASE ORAL DAILY
Qty: 30 TABLET | Refills: 3 | Status: SHIPPED | OUTPATIENT
Start: 2023-07-05

## 2023-07-05 NOTE — TELEPHONE ENCOUNTER
Incoming from patient. Two patient identifiers confirmed. Patient requesting medication refill.     PCP: Demond Loving MD  Last appt:  6/15/2023   Future Appointments   Date Time Provider [address] [place] [address]   6/20/2024 10:00 AM Hari Estrada MD [facility] BS AMB       Medication requested: Metoprolol    Pharmacy: [facility] #[id number]

## 2023-07-05 NOTE — TELEPHONE ENCOUNTER
PCP: Bob Bonner MD    Last appt:  6/15/2023   Future Appointments   Date Time Provider [address]   6/20/2024 10:00 AM Hari Pinto MD [facility] BS AMB       Requested Prescriptions     Pending Prescriptions Disp Refills    metoprolol succinate (TOPROL XL) 50 MG extended release tablet 30 tablet 3     Sig: Take 1 tablet by mouth daily

## 2023-08-22 RX ORDER — AMIODARONE HYDROCHLORIDE 200 MG/1
TABLET ORAL
Qty: 90 TABLET | Refills: 3 | Status: SHIPPED | OUTPATIENT
Start: 2023-08-22

## 2023-11-06 RX ORDER — METOPROLOL SUCCINATE 50 MG/1
50 TABLET, EXTENDED RELEASE ORAL DAILY
Qty: 30 TABLET | Refills: 3 | Status: SHIPPED | OUTPATIENT
Start: 2023-11-06

## 2023-11-06 NOTE — TELEPHONE ENCOUNTER
PCP: Rodney Husain MD    Last appt:  6/15/2023   Future Appointments   Date Time Provider [address]   6/20/2024 10:00 AM Yomaira Patricia MD [facility] BS AMB       Requested Prescriptions     Pending Prescriptions Disp Refills    metoprolol succinate (TOPROL XL) 50 MG extended release tablet 30 tablet 3     Sig: Take 1 tablet by mouth daily

## 2023-12-14 ENCOUNTER — TELEPHONE (OUTPATIENT)
Age: 58
End: 2023-12-14

## 2023-12-14 NOTE — TELEPHONE ENCOUNTER
Incoming from patient. Two patient identifiers confirmed. Patient requesting medication refill.    PCP: Alanna Pink MD  Last appt:  6/15/2023   Future Appointments   Date Time Provider Department Center   6/20/2024 10:00 AM Hari Ochoa MD [facility] AMB       Medication requested: losartan 50 mg (Emergency 30 day supply) The pharmacy lost the last script.  Insurance won't cover it. Said Dr. Ochoa upped it to 50mg instead of 25 mg.     Pharmacy: [facility] #[id number]        Hydroxyzine Counseling: Patient advised that the medication is sedating and not to drive a car after taking this medication.  Patient informed of potential adverse effects including but not limited to dry mouth, urinary retention, and blurry vision.  The patient verbalized understanding of the proper use and possible adverse effects of hydroxyzine.  All of the patient's questions and concerns were addressed.

## 2023-12-21 NOTE — TELEPHONE ENCOUNTER
PCP: Cesar Anders MD    Last appt:  6/15/2023   Future Appointments   Date Time Provider [address]   6/20/2024 10:00 AM Maryellen Gtz MD [facility] AMB       Requested Prescriptions     Pending Prescriptions Disp Refills    losartan (COZAAR) 25 MG tablet 90 tablet 2     Sig: Take 1 tablet by mouth daily     :

## 2023-12-26 RX ORDER — LOSARTAN POTASSIUM 25 MG/1
25 TABLET ORAL DAILY
Qty: 90 TABLET | Refills: 2 | Status: SHIPPED | OUTPATIENT
Start: 2023-12-26

## 2024-02-08 RX ORDER — METOPROLOL SUCCINATE 50 MG/1
50 TABLET, EXTENDED RELEASE ORAL DAILY
Qty: 30 TABLET | Refills: 5 | Status: SHIPPED | OUTPATIENT
Start: 2024-02-08

## 2024-02-08 NOTE — TELEPHONE ENCOUNTER
PCP: Alanna Pink MD    Last appt:  6/15/2023   Future Appointments   Date Time Provider Department Center   6/20/2024 10:00 AM Hari Ochoa MD [facility] AMB       Requested Prescriptions     Pending Prescriptions Disp Refills    metoprolol succinate (TOPROL XL) 50 MG extended release tablet 30 tablet 5     Sig: Take 1 tablet by mouth daily

## 2024-03-28 NOTE — TELEPHONE ENCOUNTER
PCP: Alanna Pink MD    Last appt:  6/15/2023   Future Appointments   Date Time Provider Department Center   6/20/2024 10:00 AM Hari Ochoa MD [facility] AMB       Requested Prescriptions     Pending Prescriptions Disp Refills    rivaroxaban (XARELTO) 20 MG TABS tablet 90 tablet 3     Sig: take 1 tablet by mouth once daily with dinner       Request for a 30 or 90 day supply? Provider Discretion    Pharmacy: confirm     Other Comments: n/a

## 2024-03-28 NOTE — TELEPHONE ENCOUNTER
Incoming from patient. Two patient identifiers confirmed. Patient requesting medication refill.    PCP: Alanna Pink MD  Last appt:  6/15/2023   Future Appointments   Date Time Provider Department Center   6/20/2024 10:00 AM Hari Ochoa MD [facility] AMB       Medication requested: Xarelto 20mg, amiodarone 200mg    Pharmacy: [facility] #[id number]     Patient would like the medications to be sent to [facility] instead of [facility]

## 2024-04-09 ENCOUNTER — TELEPHONE (OUTPATIENT)
Age: 59
End: 2024-04-09

## 2024-04-09 DIAGNOSIS — E78.00 PURE HYPERCHOLESTEROLEMIA: ICD-10-CM

## 2024-04-09 DIAGNOSIS — I10 ESSENTIAL HYPERTENSION WITH GOAL BLOOD PRESSURE LESS THAN 140/90: Primary | ICD-10-CM

## 2024-04-09 RX ORDER — AMIODARONE HYDROCHLORIDE 200 MG/1
200 TABLET ORAL DAILY
Qty: 90 TABLET | Refills: 3 | Status: CANCELLED | OUTPATIENT
Start: 2024-04-09 | End: 2024-05-09

## 2024-04-09 NOTE — TELEPHONE ENCOUNTER
Incoming from patient. Two patient identifiers confirmed. Patient requesting medication refill.    PCP: Alanna Pink MD  Last appt:  6/15/2023   Future Appointments   Date Time Provider Department Center   6/20/2024 10:00 AM Hari Ochoa MD [facility] AMB       Medication requested: Amiodarone 200mg    Pharmacy: [facility] #[id number]    [facility]

## 2024-04-11 NOTE — TELEPHONE ENCOUNTER
PCP: Alanna Pink MD    Last appt:  6/15/2023   Future Appointments   Date Time Provider Department Center   6/20/2024 10:00 AM Hari Ochoa MD [facility] AMB       Requested Prescriptions     Pending Prescriptions Disp Refills    amiodarone (CORDARONE) 200 MG tablet 90 tablet 3     Sig: Take 1 tablet by mouth daily       Request for a 30 or 90 day supply? Provider Discretion    Pharmacy: pharmacy change     Other Comments:n/a

## 2024-04-12 RX ORDER — AMIODARONE HYDROCHLORIDE 200 MG/1
200 TABLET ORAL DAILY
Qty: 90 TABLET | Refills: 1 | Status: SHIPPED | OUTPATIENT
Start: 2024-04-12

## 2024-06-20 ENCOUNTER — OFFICE VISIT (OUTPATIENT)
Age: 59
End: 2024-06-20
Payer: COMMERCIAL

## 2024-06-20 VITALS
WEIGHT: 260 LBS | DIASTOLIC BLOOD PRESSURE: 88 MMHG | BODY MASS INDEX: 32.33 KG/M2 | OXYGEN SATURATION: 97 % | SYSTOLIC BLOOD PRESSURE: 135 MMHG | HEIGHT: 75 IN | HEART RATE: 61 BPM

## 2024-06-20 DIAGNOSIS — I10 ESSENTIAL HYPERTENSION WITH GOAL BLOOD PRESSURE LESS THAN 140/90: Primary | ICD-10-CM

## 2024-06-20 PROCEDURE — 99214 OFFICE O/P EST MOD 30 MIN: CPT | Performed by: INTERNAL MEDICINE

## 2024-06-20 PROCEDURE — 93000 ELECTROCARDIOGRAM COMPLETE: CPT | Performed by: INTERNAL MEDICINE

## 2024-06-20 PROCEDURE — 3075F SYST BP GE 130 - 139MM HG: CPT | Performed by: INTERNAL MEDICINE

## 2024-06-20 PROCEDURE — 3079F DIAST BP 80-89 MM HG: CPT | Performed by: INTERNAL MEDICINE

## 2024-06-20 ASSESSMENT — PATIENT HEALTH QUESTIONNAIRE - PHQ9
SUM OF ALL RESPONSES TO PHQ QUESTIONS 1-9: 0
2. FEELING DOWN, DEPRESSED OR HOPELESS: NOT AT ALL
SUM OF ALL RESPONSES TO PHQ QUESTIONS 1-9: 0
SUM OF ALL RESPONSES TO PHQ9 QUESTIONS 1 & 2: 0
SUM OF ALL RESPONSES TO PHQ QUESTIONS 1-9: 0
1. LITTLE INTEREST OR PLEASURE IN DOING THINGS: NOT AT ALL
SUM OF ALL RESPONSES TO PHQ QUESTIONS 1-9: 0

## 2024-06-20 NOTE — PROGRESS NOTES
Identified pt with two pt identifiers(name and ). Reviewed record in preparation for visit and have obtained necessary documentation.    Kasi Baker presents today for   Chief Complaint   Patient presents with    Follow-up       Pt c/o D            Kasi Baker preferred language for health care discussion is english/other.    Personal Protective Equipment:   Personal Protective Equipment was used including: mask-surgical and hands-gloves. Patient was placed on no precaution(s). Patient was masked.    Precautions:   Patient currently on None  Patient currently roomed with door closed.    Is someone accompanying this pt? no    Is the patient using any DME equipment during OV? no    Depression Screenin/20/2024    10:11 AM 6/15/2023    11:11 AM   PHQ-9 Questionaire   Little interest or pleasure in doing things 0 0   Feeling down, depressed, or hopeless 0 0   PHQ-9 Total Score 0 0        Learning Assessment:  No question data found.    Abuse Screening:       No data to display                   Fall Risk       No data to display                  Pt currently taking Anticoagulant /Antiplatelet therapy? no    Coordination of Care:  1. Have you been to the ER, urgent care clinic since your last visit? Hospitalized since your last visit? no    2. Have you seen or consulted any other health care providers outside of the [facility] since your last visit? Include any pap smears or colon screening. no      Please see Red banners under Allergies and Med Rec to remove outside inquires. All correct information has been verified with patient and added to chart.     Medication's patient's would liked removed has been marked not taking to be removed per Verbal order and read back per Hari Ochoa MD    
unstable angina or decompensated heart failure    Importance of diet and exercise was discussed with patient.    This plan was discussed with patient who is in agreement.    Thank you for allowing me to participate in patient care. Please feel free to call me if you have any question or concern.     Hari Ochoa MD  Please note: This document has been produced using voice recognition software. Unrecognized errors in transcription may be present.

## 2024-07-26 ENCOUNTER — NURSE ONLY (OUTPATIENT)
Age: 59
End: 2024-07-26

## 2024-07-26 VITALS — SYSTOLIC BLOOD PRESSURE: 132 MMHG | DIASTOLIC BLOOD PRESSURE: 82 MMHG | HEART RATE: 56 BPM

## 2024-07-26 DIAGNOSIS — Z01.30 BLOOD PRESSURE CHECK: Primary | ICD-10-CM

## 2024-07-26 RX ORDER — LOSARTAN POTASSIUM 50 MG/1
50 TABLET ORAL
COMMUNITY
Start: 2024-07-05 | End: 2024-10-03

## 2024-07-26 NOTE — PROGRESS NOTES
Kasi Baker was seen in our office today for BP evaluation. Listed below are the patients current meds:      Current Outpatient Medications:     losartan (COZAAR) 50 MG tablet, Take 1 tablet by mouth, Disp: , Rfl:     amiodarone (CORDARONE) 200 MG tablet, Take 1 tablet by mouth daily, Disp: 90 tablet, Rfl: 1    rivaroxaban (XARELTO) 20 MG TABS tablet, take 1 tablet by mouth once daily with dinner, Disp: 90 tablet, Rfl: 3    metoprolol succinate (TOPROL XL) 50 MG extended release tablet, Take 1 tablet by mouth daily, Disp: 30 tablet, Rfl: 5      No current facility-administered medications for this visit.    Vitals:    07/26/24 0953 07/26/24 1005   BP: (!) 147/87 132/82   Site: Left Upper Arm Left Upper Arm   Position: Sitting Sitting   Cuff Size: Large Adult Large Adult   Pulse: 56 56        Plan:     Patient to continue current medications. Advised patient that I would forward to Dr. Ochoa for review and further instructions. Advised patient that we would call within 24-48 hours with any changes. Patient verbalized understanding.

## 2024-10-07 NOTE — TELEPHONE ENCOUNTER
Patient called to report he is out of refills on METOPROLOL SUCCINATE, per his [facility].  Patient request a refill order to be sent to his pharmacy. Patient was advised it appears he has refills available but says pharmacy staff advised him he does not. Patient can be reached at [phone] if needed.

## 2024-10-07 NOTE — TELEPHONE ENCOUNTER
PCP: Alanna Pink MD    Last appt:  6/20/2024   Future Appointments   Date Time Provider Department Center   11/25/2024  8:30 AM Hari Ochoa MD [facility] BS AMB   3/20/2025  2:00 PM Clarita Knott PA-C [facility] BS AMB       Requested Prescriptions     Pending Prescriptions Disp Refills    metoprolol succinate (TOPROL XL) 50 MG extended release tablet 30 tablet 5     Sig: Take 1 tablet by mouth daily       Request for a 30 or 90 day supply? Provider Discretion    Pharmacy: CONFIRMED    Other Comments: N/A   done

## 2024-10-08 RX ORDER — METOPROLOL SUCCINATE 50 MG/1
50 TABLET, EXTENDED RELEASE ORAL DAILY
Qty: 30 TABLET | Refills: 5 | Status: SHIPPED | OUTPATIENT
Start: 2024-10-08

## 2024-11-06 NOTE — TELEPHONE ENCOUNTER
Patient called to request a refill of AMIODARONE.  Patient confirmed the correct pharmacy is the [facility] on [address].

## 2024-11-08 RX ORDER — AMIODARONE HYDROCHLORIDE 200 MG/1
200 TABLET ORAL DAILY
Qty: 90 TABLET | Refills: 2 | Status: SHIPPED | OUTPATIENT
Start: 2024-11-08

## 2024-11-08 NOTE — TELEPHONE ENCOUNTER
PCP: Alanna Pink MD    Last appt:  6/20/2024   Future Appointments   Date Time Provider Department Center   11/25/2024  8:30 AM [facility]   3/20/2025  2:00 PM Clarita Knott PA-C [facility] BS AMB       Requested Prescriptions     Pending Prescriptions Disp Refills    amiodarone (CORDARONE) 200 MG tablet 90 tablet 2     Sig: Take 1 tablet by mouth daily       Request for a 30 or 90 day supply? Provider Discretion    Pharmacy: confirmed     Other Comments:n/a

## 2024-11-25 ENCOUNTER — OFFICE VISIT (OUTPATIENT)
Age: 59
End: 2024-11-25
Payer: COMMERCIAL

## 2024-11-25 VITALS
WEIGHT: 265 LBS | SYSTOLIC BLOOD PRESSURE: 130 MMHG | HEART RATE: 57 BPM | DIASTOLIC BLOOD PRESSURE: 82 MMHG | BODY MASS INDEX: 32.95 KG/M2 | OXYGEN SATURATION: 98 % | HEIGHT: 75 IN

## 2024-11-25 DIAGNOSIS — Z01.818 PRE-OP EVALUATION: ICD-10-CM

## 2024-11-25 DIAGNOSIS — I10 ESSENTIAL HYPERTENSION WITH GOAL BLOOD PRESSURE LESS THAN 140/90: Primary | ICD-10-CM

## 2024-11-25 DIAGNOSIS — I48.0 PAF (PAROXYSMAL ATRIAL FIBRILLATION) (HCC): ICD-10-CM

## 2024-11-25 PROCEDURE — 3079F DIAST BP 80-89 MM HG: CPT | Performed by: INTERNAL MEDICINE

## 2024-11-25 PROCEDURE — 99214 OFFICE O/P EST MOD 30 MIN: CPT | Performed by: INTERNAL MEDICINE

## 2024-11-25 PROCEDURE — 3075F SYST BP GE 130 - 139MM HG: CPT | Performed by: INTERNAL MEDICINE

## 2024-11-25 NOTE — PROGRESS NOTES
status more than 6 METS based on history  Acceptable candidate without any further cardiac workup at this time  Okay to stop anticoagulation 48 hours before surgery    Currently patient has no unstable angina or decompensated heart failure    Importance of diet and exercise was discussed with patient.    This plan was discussed with patient who is in agreement.    Thank you for allowing me to participate in patient care. Please feel free to call me if you have any question or concern.     Hari Ochoa MD  Please note: This document has been produced using voice recognition software. Unrecognized errors in transcription may be present.

## 2025-02-03 NOTE — PROGRESS NOTES
Navdeep Curtis presents today for   Chief Complaint   Patient presents with    Follow-up     work in for [address] [place] [address] per Dr. Marquita Zuluaga preferred language for health care discussion is english/other. Is someone accompanying this pt? no    Is the patient using any DME equipment during [address]? no    Depression Screening:  3 most recent PHQ Screens 6/15/2021   Little interest or pleasure in doing things Not at all   Feeling down, depressed, irritable, or hopeless Not at all   Total Score PHQ 2 0       Learning Assessment:  Learning Assessment 5/12/2016   PRIMARY LEARNER Patient   PRIMARY LANGUAGE ENGLISH   LEARNER PREFERENCE PRIMARY DEMONSTRATION   ANSWERED BY Patient   RELATIONSHIP SELF       Abuse Screening:  Abuse Screening Questionnaire 6/15/2021   Do you ever feel afraid of your partner? N   Are you in a relationship with someone who physically or mentally threatens you? N   Is it safe for you to go home? Y     Pt currently taking Anticoagulant therapy? Xarelto     Coordination of Care:  1. Have you been to the ER, urgent care clinic since your last visit? Hospitalized since your last visit? no    2. Have you seen or consulted any other health care providers outside of the [address] [place] [address] since your last visit? Include any pap smears or colon screening.  no Referral placed and faxed.

## 2025-03-20 ENCOUNTER — OFFICE VISIT (OUTPATIENT)
Age: 60
End: 2025-03-20
Payer: COMMERCIAL

## 2025-03-20 VITALS
HEIGHT: 75 IN | WEIGHT: 275 LBS | BODY MASS INDEX: 34.19 KG/M2 | SYSTOLIC BLOOD PRESSURE: 160 MMHG | DIASTOLIC BLOOD PRESSURE: 102 MMHG | HEART RATE: 57 BPM | OXYGEN SATURATION: 95 %

## 2025-03-20 DIAGNOSIS — Z01.818 PRE-OP EVALUATION: ICD-10-CM

## 2025-03-20 DIAGNOSIS — I10 ESSENTIAL HYPERTENSION WITH GOAL BLOOD PRESSURE LESS THAN 140/90: ICD-10-CM

## 2025-03-20 DIAGNOSIS — I48.0 PAROXYSMAL A-FIB (HCC): Primary | ICD-10-CM

## 2025-03-20 PROCEDURE — 3077F SYST BP >= 140 MM HG: CPT | Performed by: PHYSICIAN ASSISTANT

## 2025-03-20 PROCEDURE — 99214 OFFICE O/P EST MOD 30 MIN: CPT | Performed by: PHYSICIAN ASSISTANT

## 2025-03-20 PROCEDURE — 3080F DIAST BP >= 90 MM HG: CPT | Performed by: PHYSICIAN ASSISTANT

## 2025-03-20 ASSESSMENT — PATIENT HEALTH QUESTIONNAIRE - PHQ9
1. LITTLE INTEREST OR PLEASURE IN DOING THINGS: NOT AT ALL
SUM OF ALL RESPONSES TO PHQ QUESTIONS 1-9: 0
2. FEELING DOWN, DEPRESSED OR HOPELESS: NOT AT ALL
SUM OF ALL RESPONSES TO PHQ QUESTIONS 1-9: 0

## 2025-03-20 NOTE — PROGRESS NOTES
you seen or consulted any other health care providers outside of the [facility] since your last visit? Include any pap smears or colon screening. no      Please see Red banners under Allergies and Med Rec to remove outside inquires. All correct information has been verified with patient and added to chart.     Medication's patient's would liked removed has been marked not taking to be removed per Verbal order and read back per Clarita Knott PA-C   
 Aorta: Mildly dilated ascending aorta. Ao ascending diameter is 3.8 cm.    Signed by: Hari Ochoa V on 7/5/2023  1:06 PM       STRESS TEST (EST, PHARM, NUC, ECHO etc)  No results found for this or any previous visit.       CATHETERIZATION  No results found for this or any previous visit.         HOSPITALIZATIONS   2/2025, ER visit for rib pain s/p mechanical fall     IMPRESSION & PLAN:  Kasi Baker is a 59 y.o. male with:     Atrial fibrillation:    Paroxysmal a.fib, in SR currently.   BARBARA cardioversion (12/16) was successful and back in NSR  But on 12/20/16, back in a.fib. Started on amiodarone on 12/21/16   Continue amiodarone and metoprolol.  Continue Xarelto, no s/s concerning for bleeding.      Hypertension: /109 mmHg, repeat /102 mmHg. Reports BP is usually more controlled. May be 2/2 to rib pain. Advised low sodium diet, monitor BP at home.If elevated at follow up appt may need to increase Losartan to 100 mg daily.     Currently taking metoprolol and losartan.    Importance of diet and exercise was discussed with patient.    Goiter: s/p thyroidectomy      Pre op, patient is scheduled to have a colonoscopy next month. No s/s concerning for angina or decompensated heart failure. Functional status METS > 6, no prohibitive factors from a cardiac standpoint. Hold Xarelto 48 hrs prior to procedure. Timing to resume per GI.               Clarita Knott PA-C  [facility]       Thank you for allowing me to participate in patient care. Please feel free to call me if you have any question or concern.   Please note: This document has been produced using voice recognition software. Unrecognized errors in transcription may be present.

## 2025-03-31 RX ORDER — METOPROLOL SUCCINATE 50 MG/1
50 TABLET, EXTENDED RELEASE ORAL DAILY
Qty: 90 TABLET | Refills: 2 | Status: SHIPPED | OUTPATIENT
Start: 2025-03-31

## 2025-03-31 NOTE — TELEPHONE ENCOUNTER
PCP: Alanna Pink MD    Last appt:  11/25/2024   Future Appointments   Date Time Provider Department Center   1/8/2026  8:45 AM Hari Ochoa MD [facility] BS AMB       Requested Prescriptions     Pending Prescriptions Disp Refills    metoprolol succinate (TOPROL XL) 50 MG extended release tablet [Pharmacy Med Name: METOPROLOL SUCC ER 50 MG TAB] 90 tablet 1     Sig: TAKE 1 TABLET BY MOUTH EVERY DAY       Request for a 30 or 90 day supply? Provider Discretion    Pharmacy: Confirmed     Other Comments: N/A

## 2025-06-06 NOTE — TELEPHONE ENCOUNTER
PCP: Alanna Pink MD    Last appt:  11/25/2024   Future Appointments   Date Time Provider Department Center   1/8/2026  8:45 AM Hari Ochoa MD [facility] BS AMB       Requested Prescriptions     Pending Prescriptions Disp Refills    rivaroxaban (XARELTO) 20 MG TABS tablet [Pharmacy Med Name: XARELTO 20 MG TABLET] 90 tablet 3     Sig: TAKE 1 TABLET BY MOUTH ONCE DAILY WITH DINNER       Request for a 30 or 90 day supply? Provider Discretion    Pharmacy: confirmed    Other Comments:n/a

## 2025-08-08 RX ORDER — AMIODARONE HYDROCHLORIDE 200 MG/1
200 TABLET ORAL DAILY
Qty: 90 TABLET | Refills: 3 | Status: SHIPPED | OUTPATIENT
Start: 2025-08-08